# Patient Record
Sex: FEMALE | URBAN - METROPOLITAN AREA
[De-identification: names, ages, dates, MRNs, and addresses within clinical notes are randomized per-mention and may not be internally consistent; named-entity substitution may affect disease eponyms.]

---

## 2021-02-14 ENCOUNTER — INPATIENT (INPATIENT)
Facility: HOSPITAL | Age: 21
LOS: 4 days | Discharge: HOME | DRG: 918 | End: 2021-02-19
Attending: INTERNAL MEDICINE | Admitting: INTERNAL MEDICINE
Payer: MEDICAID

## 2021-02-14 VITALS
HEART RATE: 107 BPM | DIASTOLIC BLOOD PRESSURE: 95 MMHG | OXYGEN SATURATION: 96 % | HEIGHT: 65 IN | WEIGHT: 130.07 LBS | RESPIRATION RATE: 15 BRPM | TEMPERATURE: 98 F | SYSTOLIC BLOOD PRESSURE: 133 MMHG

## 2021-02-14 LAB
ALBUMIN SERPL ELPH-MCNC: 4.5 G/DL — SIGNIFICANT CHANGE UP (ref 3.4–5)
ALP SERPL-CCNC: 54 U/L — SIGNIFICANT CHANGE UP (ref 40–120)
ALT FLD-CCNC: 20 U/L — SIGNIFICANT CHANGE UP (ref 12–42)
AMPHET UR-MCNC: NEGATIVE — SIGNIFICANT CHANGE UP
ANION GAP SERPL CALC-SCNC: 8 MMOL/L — LOW (ref 9–16)
APAP SERPL-MCNC: <2 UG/ML — LOW (ref 10–30)
APPEARANCE UR: CLEAR — SIGNIFICANT CHANGE UP
APTT BLD: 28.9 SEC — SIGNIFICANT CHANGE UP (ref 27.5–35.5)
AST SERPL-CCNC: 29 U/L — SIGNIFICANT CHANGE UP (ref 15–37)
BACTERIA # UR AUTO: ABNORMAL /HPF
BARBITURATES UR SCN-MCNC: NEGATIVE — SIGNIFICANT CHANGE UP
BENZODIAZ UR-MCNC: NEGATIVE — SIGNIFICANT CHANGE UP
BILIRUB SERPL-MCNC: 0.3 MG/DL — SIGNIFICANT CHANGE UP (ref 0.2–1.2)
BILIRUB UR-MCNC: NEGATIVE — SIGNIFICANT CHANGE UP
BUN SERPL-MCNC: 12 MG/DL — SIGNIFICANT CHANGE UP (ref 7–23)
CALCIUM SERPL-MCNC: 9.5 MG/DL — SIGNIFICANT CHANGE UP (ref 8.5–10.5)
CHLORIDE SERPL-SCNC: 107 MMOL/L — SIGNIFICANT CHANGE UP (ref 96–108)
CK SERPL-CCNC: 112 U/L — SIGNIFICANT CHANGE UP (ref 26–192)
CO2 SERPL-SCNC: 29 MMOL/L — SIGNIFICANT CHANGE UP (ref 22–31)
COCAINE METAB.OTHER UR-MCNC: NEGATIVE — SIGNIFICANT CHANGE UP
COLOR SPEC: YELLOW — SIGNIFICANT CHANGE UP
CREAT SERPL-MCNC: 0.66 MG/DL — SIGNIFICANT CHANGE UP (ref 0.5–1.3)
DIFF PNL FLD: NEGATIVE — SIGNIFICANT CHANGE UP
EPI CELLS # UR: ABNORMAL /HPF (ref 0–5)
ETHANOL SERPL-MCNC: 175 MG/DL — HIGH
GLUCOSE SERPL-MCNC: 85 MG/DL — SIGNIFICANT CHANGE UP (ref 70–99)
GLUCOSE UR QL: NEGATIVE — SIGNIFICANT CHANGE UP
HCG SERPL-ACNC: <1 MIU/ML — SIGNIFICANT CHANGE UP
INR BLD: 1.06 — SIGNIFICANT CHANGE UP (ref 0.88–1.16)
KETONES UR-MCNC: NEGATIVE — SIGNIFICANT CHANGE UP
LACTATE SERPL-SCNC: 3.2 MMOL/L — HIGH (ref 0.4–2)
LEUKOCYTE ESTERASE UR-ACNC: ABNORMAL
MAGNESIUM SERPL-MCNC: 2.2 MG/DL — SIGNIFICANT CHANGE UP (ref 1.6–2.6)
METHADONE UR-MCNC: NEGATIVE — SIGNIFICANT CHANGE UP
NITRITE UR-MCNC: NEGATIVE — SIGNIFICANT CHANGE UP
OPIATES UR-MCNC: NEGATIVE — SIGNIFICANT CHANGE UP
PCO2 BLDV: 55 MMHG — HIGH (ref 41–51)
PCP SPEC-MCNC: SIGNIFICANT CHANGE UP
PCP UR-MCNC: NEGATIVE — SIGNIFICANT CHANGE UP
PH BLDV: 7.33 — SIGNIFICANT CHANGE UP (ref 7.32–7.43)
PH UR: 5.5 — SIGNIFICANT CHANGE UP (ref 5–8)
PO2 BLDV: 26 MMHG — LOW (ref 35–40)
POTASSIUM SERPL-MCNC: 3.8 MMOL/L — SIGNIFICANT CHANGE UP (ref 3.5–5.3)
POTASSIUM SERPL-SCNC: 3.8 MMOL/L — SIGNIFICANT CHANGE UP (ref 3.5–5.3)
PROT SERPL-MCNC: 7.8 G/DL — SIGNIFICANT CHANGE UP (ref 6.4–8.2)
PROT UR-MCNC: NEGATIVE MG/DL — SIGNIFICANT CHANGE UP
PROTHROM AB SERPL-ACNC: 12.7 SEC — SIGNIFICANT CHANGE UP (ref 10.6–13.6)
RBC CASTS # UR COMP ASSIST: < 5 /HPF — SIGNIFICANT CHANGE UP
SALICYLATES SERPL-MCNC: 1.4 MG/DL — LOW (ref 2.8–20)
SAO2 % BLDV: 38 % — SIGNIFICANT CHANGE UP
SARS-COV-2 RNA SPEC QL NAA+PROBE: SIGNIFICANT CHANGE UP
SODIUM SERPL-SCNC: 144 MMOL/L — SIGNIFICANT CHANGE UP (ref 132–145)
SP GR SPEC: 1.01 — SIGNIFICANT CHANGE UP (ref 1–1.03)
THC UR QL: NEGATIVE — SIGNIFICANT CHANGE UP
TROPONIN I SERPL-MCNC: <0.017 NG/ML — LOW (ref 0.02–0.06)
UROBILINOGEN FLD QL: 0.2 E.U./DL — SIGNIFICANT CHANGE UP
WBC UR QL: < 5 /HPF — SIGNIFICANT CHANGE UP

## 2021-02-14 PROCEDURE — 99285 EMERGENCY DEPT VISIT HI MDM: CPT | Mod: 25

## 2021-02-14 PROCEDURE — 93010 ELECTROCARDIOGRAM REPORT: CPT

## 2021-02-14 PROCEDURE — 71045 X-RAY EXAM CHEST 1 VIEW: CPT | Mod: 26

## 2021-02-14 RX ORDER — SODIUM CHLORIDE 9 MG/ML
2000 INJECTION INTRAMUSCULAR; INTRAVENOUS; SUBCUTANEOUS ONCE
Refills: 0 | Status: COMPLETED | OUTPATIENT
Start: 2021-02-14 | End: 2021-02-14

## 2021-02-14 RX ORDER — ONDANSETRON 8 MG/1
4 TABLET, FILM COATED ORAL ONCE
Refills: 0 | Status: COMPLETED | OUTPATIENT
Start: 2021-02-14 | End: 2021-02-14

## 2021-02-14 RX ADMIN — SODIUM CHLORIDE 2000 MILLILITER(S): 9 INJECTION INTRAMUSCULAR; INTRAVENOUS; SUBCUTANEOUS at 21:46

## 2021-02-14 NOTE — ED PROVIDER NOTE - NS ED MD DISPO ISOLATION TYPES
Gabe Victoria is a 52 y.o. female (: 1970) presenting to address:    Chief Complaint   Patient presents with    Results     LAB RESULTS       There were no vitals filed for this visit. Hearing/Vision:   No exam data present    Learning Assessment:     Learning Assessment 2015   PRIMARY LEARNER Patient   HIGHEST LEVEL OF EDUCATION - PRIMARY LEARNER  4 YEARS OF COLLEGE   BARRIERS PRIMARY LEARNER NONE   CO-LEARNER CAREGIVER No   PRIMARY LANGUAGE ENGLISH   LEARNER PREFERENCE PRIMARY DEMONSTRATION   ANSWERED BY self   RELATIONSHIP SELF     Depression Screening:     3 most recent PHQ Screens 2018   Little interest or pleasure in doing things Not at all   Feeling down, depressed, irritable, or hopeless Not at all   Total Score PHQ 2 0     Fall Risk Assessment:     Fall Risk Assessment, last 12 mths 3/27/2020   Able to walk? Yes   Fall in past 12 months? No     Abuse Screening:     Abuse Screening Questionnaire 3/27/2020   Do you ever feel afraid of your partner? N   Are you in a relationship with someone who physically or mentally threatens you? N   Is it safe for you to go home? Y     Coordination of Care Questionaire:   1. Have you been to the ER, urgent care clinic since your last visit? Hospitalized since your last visit? NO    2. Have you seen or consulted any other health care providers outside of the [address] [place] [address] since your last visit? Include any pap smears or colon screening. YES    Advanced Directive:   1. Do you have an Advanced Directive? NO    2. Would you like information on Advanced Directives? YES    Mobile number on file is fine. None

## 2021-02-14 NOTE — ED PROVIDER NOTE - CLINICAL SUMMARY MEDICAL DECISION MAKING FREE TEXT BOX
plan to do a full tox work up, spoke with Chapo sotelo present and available  and mother Yadi  plan to contact them, and consult tox. currently stable, good end tidal CO2, has no signs of head injury

## 2021-02-14 NOTE — ED ADULT NURSE NOTE - NSIMPLEMENTINTERV_GEN_ALL_ED
Implemented All Fall Risk Interventions:  [place] to call system. Call bell, personal items and telephone within reach. Instruct patient to call for assistance. Room bathroom lighting operational. Non-slip footwear when patient is off stretcher. Physically safe environment: no spills, clutter or unnecessary equipment. Stretcher in lowest position, wheels locked, appropriate side rails in place. Provide visual cue, wrist band, yellow gown, etc. Monitor gait and stability. Monitor for mental status changes and reorient to person, place, and time. Review medications for side effects contributing to fall risk. Reinforce activity limits and safety measures with patient and family.

## 2021-02-14 NOTE — CONSULT NOTE ADULT - ASSESSMENT
·	This patient is sedated and has an unclear history of possible multiple drug ingestions.   ·	Zolpidem overdosages can cause sedation. Treatment is generally supportive with close monitoring of airway.   ·	Sertraline is an SSRI and overdosages can cause serotonin toxicity which the patient does not clearly exhibit.   ·	Lithium overdosages can present acutely with GI disturbances and chronically with CNS signs and symptoms such as tremor, fasiculations, hyperreflexia, nystagmus, choreoathetosis, coma, seizures. This patient does not clearly demonstrate any of these however in light of an unclear history, would agree to sending serum Lithium levels.    ·	Continue supportive care and admit the patient for further observation until patient is back to baseline mentation.     Thank you for the consult.  ·	This patient is sedated and has an unclear history of possible multiple drug ingestions.   ·	Zolpidem overdosages can cause sedation. Treatment is generally supportive with close monitoring of airway.   ·	Sertraline is an SSRI and overdosages can cause serotonin toxicity which the patient does not clearly exhibit.   ·	Lithium overdosages can present acutely with GI disturbances and chronically with CNS signs and symptoms such as tremor, fasiculations, hyperreflexia, nystagmus, choreoathetosis, coma, seizures. This patient does not clearly demonstrate any of these however in light of an unclear history, would agree to sending serum Lithium levels.    ·	Continue supportive care and admit the patient for further observation until patient is back to baseline mentation.     FOLLOW UP @ 1:13AM    ·	Patient improved  and awake after supportive care  ·	Now endorses to have taken 6 tabs of Lithium and 12 tabes of Sertraline as well.  ·	Recommend to repeat Serum Lithium levels after IVF and trend results.     Thank you for the consult.

## 2021-02-14 NOTE — ED ADULT TRIAGE NOTE - CHIEF COMPLAINT QUOTE
Pt brought in by EMS for complaint of an overdose of zolpidem. Per pt's friend, pt had gone into the bathroom approximately 30 minutes PTA and then told him she took about 20 pills of 5mg tablets of zolpidem. Pt's friend also states she drank 5 or more alcoholic drinks today. Pt drowsy at triage, unable to answer questions, vomited once PTA.

## 2021-02-14 NOTE — ED PROVIDER NOTE - OBJECTIVE STATEMENT
19 yo F, unknown PMHx, presents with overdose. Patient arrives via EMS with a friend whom she is staying with. per friend - Chapo , patient has been staying with him since this past wednesday. lives with mother in [facility] - [place] . Notes they were out to dinner this evening around 7 pm, and prior to that patient had been drinking. he estimates she had about 6 drinks. During dinner she left the restaurant, and wandered around, and called him that she was lost. he found her and took her back to the apartment, where she went into the bathroom. shortly after she comes out of the bathroom telling him she was worried she might die because she took pills. friend bought pill bottles and ambien bottle was empty. patient on zolpidem 5 mg 1/2-1 tablet QHS, #30 dispensed on  and bottle empty now. also takes lithium 300 mg BID, #180 dispensed on 10/21/2020 with 75 left in bottle, Hydroxyzine 25 mg 1 BID PRN, #60  on 2020, 24 left in bottle, and sertraline #90 dispensed on , 47 left in bottle. per friend, he is unaware of pmhx, or other drug use. denies witnessed trauma.

## 2021-02-14 NOTE — ED PROVIDER NOTE - PROGRESS NOTE DETAILS
spoke to toxicology fellow - Dr. Au  Does not recommend activated charcoal, based on history, and possible od with only ambien. rule out lithium overdose. supportive treatment. satting well and end tidal stable. patient is speaking with her mother on the phone, she is now awake, coherent, alert and oriented x 3 with no acute medical complaints. awaiting repeat alcohol and lactic and will consult with telypsych. patient is speaking with her mother on the phone, she is now awake, coherent, alert and oriented x 3 with no acute medical complaints. awaiting repeat alcohol and lactic and will consult with telypsych. per patient, she took a handful of sertraline about 12 pills, about 6 lihtium, and the bottle of ambien. patient notes she was trying to kill herself. notes her last admission for similar was 1 yr ago, admitted multiple times in the past since an adolescent. patient currently is more awake, awaiting sobriety to speak to telypsych. spoke to dr kelly  on call, advised to repeat lithium level 5 hours after fluid hydration. if lithium level is downtrending at that point, she is cleared medically. will repeat lihtium at 4:30 patient accepted to tele step down - dr tucker

## 2021-02-14 NOTE — ED PROVIDER NOTE - CARE PLAN
Principal Discharge DX:	Drug overdose, undetermined intent, initial encounter   Principal Discharge DX:	Drug overdose, undetermined intent, initial encounter  Secondary Diagnosis:	Suicidal ideations

## 2021-02-14 NOTE — CONSULT NOTE ADULT - SUBJECTIVE AND OBJECTIVE BOX
MEDICAL TOXICOLOGY CONSULT    HPI: 20 Yr old female unknown PMH from [place] living with a friend here in [place] brought to ED after intentional OD on unknown amount of possibly Ambien (Zolpidem) at 7 PM. She also takes Lithium, sertraline and Hydroxyzine  however a manual count does not reveal missing tablets. Friend is unsure if she dosed on all or not. She endorses that patient was drinking tonight.       ONSET / TIME of exposure(s): 7 PM    QUANTITY of exposure(s):     ROUTE of exposure: Ingestion    CONTEXT of exposure: Home    ASSOCIATED symptoms: As above.     PAST MEDICAL & SURGICAL HISTORY: Unavailable due to intoxication     REVIEW OF SYSTEMS:  Unable to perform due to intoxication.     Vital Signs Last 24 Hrs  T(C): 36.6 (14 Feb 2021 20:31), Max: 36.6 (14 Feb 2021 20:31)  T(F): 97.8 (14 Feb 2021 20:31), Max: 97.8 (14 Feb 2021 20:31)  HR: 86 (14 Feb 2021 21:48) (86 - 107)  BP: 103/68 (14 Feb 2021 21:48) (103/68 - 133/95)  BP(mean): 80 (14 Feb 2021 21:48) (80 - 86)  RR: 16 (14 Feb 2021 21:48) (15 - 18)  SpO2: 99% (14 Feb 2021 21:48) (89% - 99%)    SIGNIFICANT LABORATORY STUDIES:    02-14    144  |  107  |  12  ----------------------------<  85  3.8   |  29  |  0.66    Ca    9.5      14 Feb 2021 21:10  Mg     2.2     02-14    TPro  7.8  /  Alb  4.5  /  TBili  0.3  /  DBili  x   /  AST  29  /  ALT  20  /  AlkPhos  54  02-14      PT/INR - ( 14 Feb 2021 21:10 )   PT: 12.7 sec;   INR: 1.06          PTT - ( 14 Feb 2021 21:10 )  PTT:28.9 sec        Anion Gap: -- 02-14 @ 21:56  CK: -- 02-14 @ 21:56  Troponin:  <0.017<L>  02-14 @ 21:56  Pro-BNP:  --  02-14 @ 21:56  VBG:  --  02-14 @ 21:56  Carboxyhemoglobin %:  --  02-14 @ 21:56  Methemoglobin %:  --  02-14 @ 21:56  Osmolality Serum:  --  02-14 @ 21:56  Aspirin Level: --  02-14 @ 21:56  Acetaminophen Level:  --  02-14 @ 21:56  Ethanol Level:  --  02-14 @ 21:56  Digoxin Level:  --  02-14 @ 21:56  Phenytoin Level:  --  02-14 @ 21:56  Carbamazepine level:  --  02-14 @ 21:56  Lamotrigine level:  --  02-14 @ 21:56  Anion Gap: 8<L> 02-14 @ 21:10  CK: 112 02-14 @ 21:10  Troponin:  --  02-14 @ 21:10  Pro-BNP:  --  02-14 @ 21:10  VBG:  --  02-14 @ 21:10  Carboxyhemoglobin %:  --  02-14 @ 21:10  Methemoglobin %:  --  02-14 @ 21:10  Osmolality Serum:  --  02-14 @ 21:10  Aspirin Level: 1.4<L>  02-14 @ 21:10  Acetaminophen Level:  <2.0<L>  02-14 @ 21:10  Ethanol Level:  --  02-14 @ 21:10  Digoxin Level:  --  02-14 @ 21:10  Phenytoin Level:  --  02-14 @ 21:10  Carbamazepine level:  --  02-14 @ 21:10  Lamotrigine level:  --  02-14 @ 21:10     MEDICAL TOXICOLOGY CONSULT    HPI: 20 Yr old female unknown PMH from [place] living with a friend here in [place] brought to ED after intentional OD on unknown amount of possibly Ambien (Zolpidem) at 7 PM. She also takes Lithium, sertraline and Hydroxyzine  however a manual count does not reveal missing tablets. Friend is unsure if she dosed on all or not. She endorses that patient was drinking tonight.     ONSET / TIME of exposure(s): 7 PM    QUANTITY of exposure(s):   Zolpidem 5 mg 1/2-1 tablet QHS, #30 dispensed on  and bottle empty  Lithium 300 mg BID, #180 dispensed on 10/21/2020 with 75 left in bottle   Hydroxyzine 25 mg 1 BID PRN, #60  on 2020, 24 left in bottle  Sertraline #90 dispensed on , 47 left in bottle        ROUTE of exposure: Ingestion    CONTEXT of exposure: Home    ASSOCIATED symptoms: As above.     PAST MEDICAL & SURGICAL HISTORY: Unavailable due to intoxication     REVIEW OF SYSTEMS:  Unable to perform due to intoxication.     Vital Signs Last 24 Hrs  T(C): 36.6 (2021 20:31), Max: 36.6 (2021 20:31)  T(F): 97.8 (2021 20:31), Max: 97.8 (2021 20:31)  HR: 86 (2021 21:48) (86 - 107)  BP: 103/68 (2021 21:48) (103/68 - 133/95)  BP(mean): 80 (2021 21:48) (80 - 86)  RR: 16 (2021 21:48) (15 - 18)  SpO2: 99% (2021 21:48) (89% - 99%)    SIGNIFICANT LABORATORY STUDIES:        144  |  107  |  12  ----------------------------<  85  3.8   |  29  |  0.66    Ca    9.5      2021 21:10  Mg     2.2         TPro  7.8  /  Alb  4.5  /  TBili  0.3  /  DBili  x   /  AST  29  /  ALT  20  /  AlkPhos  54      PT/INR - ( 2021 21:10 )   PT: 12.7 sec;   INR: 1.06     PTT - ( 2021 21:10 )  PTT:28.9 sec    Troponin:  <0.017<L>   @ 21:56  Anion Gap: 8<L>  @ 21:10  Aspirin Level: 1.4<L>   @ 21:10  Acetaminophen Level:  <2.0<L>   @ 21:10       MEDICAL TOXICOLOGY CONSULT    HPI: 20 Yr old female unknown PMH from [place] living with a friend here in [place] brought to ED after intentional OD on unknown amount of possibly Ambien (Zolpidem) at 7 PM. She also takes Lithium, sertraline and Hydroxyzine however a manual count does not reveal missing tablets. Friend is unsure if she dosed on all or not. She endorses that patient was drinking tonight.     ONSET / TIME of exposure(s): 7 PM    QUANTITY of exposure(s):   Zolpidem 5 mg 1/2-1 tablet QHS, #30 dispensed on  and bottle empty  Lithium 300 mg BID, #180 dispensed on 10/21/2020 with 75 left in bottle   Hydroxyzine 25 mg 1 BID PRN, #60  on 2020, 24 left in bottle  Sertraline #90 dispensed on , 47 left in bottle        ROUTE of exposure: Ingestion    CONTEXT of exposure: Home    ASSOCIATED symptoms: As above.     PAST MEDICAL & SURGICAL HISTORY: Unavailable due to intoxication     REVIEW OF SYSTEMS:  Unable to perform due to intoxication.     Vital Signs Last 24 Hrs  T(C): 36.6 (2021 20:31), Max: 36.6 (2021 20:31)  T(F): 97.8 (2021 20:31), Max: 97.8 (2021 20:31)  HR: 86 (2021 21:48) (86 - 107)  BP: 103/68 (2021 21:48) (103/68 - 133/95)  BP(mean): 80 (2021 21:48) (80 - 86)  RR: 16 (2021 21:48) (15 - 18)  SpO2: 99% (2021 21:48) (89% - 99%)    SIGNIFICANT LABORATORY STUDIES:        144  |  107  |  12  ----------------------------<  85  3.8   |  29  |  0.66    Ca    9.5      2021 21:10  Mg     2.2         TPro  7.8  /  Alb  4.5  /  TBili  0.3  /  DBili  x   /  AST  29  /  ALT  20  /  AlkPhos  54      PT/INR - ( 2021 21:10 )   PT: 12.7 sec;   INR: 1.06     PTT - ( 2021 21:10 )  PTT:28.9 sec    Troponin:  <0.017<L>   @ 21:56  Anion Gap: 8<L>  @ 21:10  Aspirin Level: 1.4<L>   @ 21:10  Acetaminophen Level:  <2.0<L>   @ 21:10

## 2021-02-15 DIAGNOSIS — Z98.890 OTHER SPECIFIED POSTPROCEDURAL STATES: Chronic | ICD-10-CM

## 2021-02-15 DIAGNOSIS — Z29.9 ENCOUNTER FOR PROPHYLACTIC MEASURES, UNSPECIFIED: ICD-10-CM

## 2021-02-15 DIAGNOSIS — T14.91XA SUICIDE ATTEMPT, INITIAL ENCOUNTER: ICD-10-CM

## 2021-02-15 DIAGNOSIS — R94.31 ABNORMAL ELECTROCARDIOGRAM [ECG] [EKG]: ICD-10-CM

## 2021-02-15 DIAGNOSIS — F31.81 BIPOLAR II DISORDER: ICD-10-CM

## 2021-02-15 LAB
ALBUMIN SERPL ELPH-MCNC: 4.2 G/DL — SIGNIFICANT CHANGE UP (ref 3.3–5)
ALP SERPL-CCNC: 47 U/L — SIGNIFICANT CHANGE UP (ref 40–120)
ALT FLD-CCNC: 10 U/L — SIGNIFICANT CHANGE UP (ref 10–45)
ANION GAP SERPL CALC-SCNC: 13 MMOL/L — SIGNIFICANT CHANGE UP (ref 5–17)
ANION GAP SERPL CALC-SCNC: 13 MMOL/L — SIGNIFICANT CHANGE UP (ref 5–17)
AST SERPL-CCNC: 17 U/L — SIGNIFICANT CHANGE UP (ref 10–40)
BASE EXCESS BLDA CALC-SCNC: -1.4 MMOL/L — SIGNIFICANT CHANGE UP (ref -2–3)
BILIRUB SERPL-MCNC: 0.4 MG/DL — SIGNIFICANT CHANGE UP (ref 0.2–1.2)
BUN SERPL-MCNC: 10 MG/DL — SIGNIFICANT CHANGE UP (ref 7–23)
BUN SERPL-MCNC: 7 MG/DL — SIGNIFICANT CHANGE UP (ref 7–23)
CALCIUM SERPL-MCNC: 8.8 MG/DL — SIGNIFICANT CHANGE UP (ref 8.4–10.5)
CALCIUM SERPL-MCNC: 9.3 MG/DL — SIGNIFICANT CHANGE UP (ref 8.4–10.5)
CHLORIDE SERPL-SCNC: 103 MMOL/L — SIGNIFICANT CHANGE UP (ref 96–108)
CHLORIDE SERPL-SCNC: 104 MMOL/L — SIGNIFICANT CHANGE UP (ref 96–108)
CO2 SERPL-SCNC: 23 MMOL/L — SIGNIFICANT CHANGE UP (ref 22–31)
CO2 SERPL-SCNC: 23 MMOL/L — SIGNIFICANT CHANGE UP (ref 22–31)
CREAT SERPL-MCNC: 0.64 MG/DL — SIGNIFICANT CHANGE UP (ref 0.5–1.3)
CREAT SERPL-MCNC: 0.76 MG/DL — SIGNIFICANT CHANGE UP (ref 0.5–1.3)
ETHANOL SERPL-MCNC: 120 MG/DL — HIGH
GAS PNL BLDA: SIGNIFICANT CHANGE UP
GLUCOSE SERPL-MCNC: 80 MG/DL — SIGNIFICANT CHANGE UP (ref 70–99)
GLUCOSE SERPL-MCNC: 95 MG/DL — SIGNIFICANT CHANGE UP (ref 70–99)
HCO3 BLDA-SCNC: 23 MMOL/L — SIGNIFICANT CHANGE UP (ref 21–28)
LACTATE SERPL-SCNC: 0.6 MMOL/L — SIGNIFICANT CHANGE UP (ref 0.5–2)
LACTATE SERPL-SCNC: 2.5 MMOL/L — HIGH (ref 0.4–2)
LITHIUM SERPL-MCNC: 0.65 MMOL/L — SIGNIFICANT CHANGE UP (ref 0.6–1.2)
LITHIUM SERPL-MCNC: 1.05 MMOL/L — SIGNIFICANT CHANGE UP (ref 0.6–1.2)
LITHIUM SERPL-MCNC: 1.3 MMOL/L — HIGH (ref 0.6–1.2)
LITHIUM SERPL-MCNC: 1.9 MMOL/L — CRITICAL HIGH (ref 0.6–1.2)
MAGNESIUM SERPL-MCNC: 1.9 MG/DL — SIGNIFICANT CHANGE UP (ref 1.6–2.6)
MAGNESIUM SERPL-MCNC: 2 MG/DL — SIGNIFICANT CHANGE UP (ref 1.6–2.6)
PCO2 BLDA: 36 MMHG — SIGNIFICANT CHANGE UP (ref 32–45)
PH BLDA: 7.41 — SIGNIFICANT CHANGE UP (ref 7.35–7.45)
PO2 BLDA: 145 MMHG — HIGH (ref 83–108)
POTASSIUM SERPL-MCNC: 3.5 MMOL/L — SIGNIFICANT CHANGE UP (ref 3.5–5.3)
POTASSIUM SERPL-MCNC: 3.9 MMOL/L — SIGNIFICANT CHANGE UP (ref 3.5–5.3)
POTASSIUM SERPL-SCNC: 3.5 MMOL/L — SIGNIFICANT CHANGE UP (ref 3.5–5.3)
POTASSIUM SERPL-SCNC: 3.9 MMOL/L — SIGNIFICANT CHANGE UP (ref 3.5–5.3)
PROT SERPL-MCNC: 6.3 G/DL — SIGNIFICANT CHANGE UP (ref 6–8.3)
SAO2 % BLDA: 99 % — SIGNIFICANT CHANGE UP (ref 95–100)
SODIUM SERPL-SCNC: 139 MMOL/L — SIGNIFICANT CHANGE UP (ref 135–145)
SODIUM SERPL-SCNC: 140 MMOL/L — SIGNIFICANT CHANGE UP (ref 135–145)

## 2021-02-15 PROCEDURE — 99223 1ST HOSP IP/OBS HIGH 75: CPT | Mod: GC

## 2021-02-15 PROCEDURE — 99220: CPT | Mod: 25

## 2021-02-15 RX ORDER — SODIUM CHLORIDE 9 MG/ML
1000 INJECTION INTRAMUSCULAR; INTRAVENOUS; SUBCUTANEOUS
Refills: 0 | Status: DISCONTINUED | OUTPATIENT
Start: 2021-02-15 | End: 2021-02-16

## 2021-02-15 RX ORDER — INFLUENZA VIRUS VACCINE 15; 15; 15; 15 UG/.5ML; UG/.5ML; UG/.5ML; UG/.5ML
0.5 SUSPENSION INTRAMUSCULAR ONCE
Refills: 0 | Status: COMPLETED | OUTPATIENT
Start: 2021-02-15 | End: 2021-02-15

## 2021-02-15 RX ORDER — POTASSIUM CHLORIDE 20 MEQ
10 PACKET (EA) ORAL ONCE
Refills: 0 | Status: COMPLETED | OUTPATIENT
Start: 2021-02-15 | End: 2021-02-17

## 2021-02-15 RX ORDER — SODIUM CHLORIDE 9 MG/ML
1000 INJECTION INTRAMUSCULAR; INTRAVENOUS; SUBCUTANEOUS ONCE
Refills: 0 | Status: COMPLETED | OUTPATIENT
Start: 2021-02-15 | End: 2021-02-15

## 2021-02-15 RX ORDER — POTASSIUM CHLORIDE 20 MEQ
40 PACKET (EA) ORAL ONCE
Refills: 0 | Status: COMPLETED | OUTPATIENT
Start: 2021-02-15 | End: 2021-02-15

## 2021-02-15 RX ADMIN — SODIUM CHLORIDE 1000 MILLILITER(S): 9 INJECTION INTRAMUSCULAR; INTRAVENOUS; SUBCUTANEOUS at 07:15

## 2021-02-15 RX ADMIN — Medication 40 MILLIEQUIVALENT(S): at 14:03

## 2021-02-15 RX ADMIN — SODIUM CHLORIDE 150 MILLILITER(S): 9 INJECTION INTRAMUSCULAR; INTRAVENOUS; SUBCUTANEOUS at 07:45

## 2021-02-15 RX ADMIN — ONDANSETRON 4 MILLIGRAM(S): 8 TABLET, FILM COATED ORAL at 00:09

## 2021-02-15 RX ADMIN — SODIUM CHLORIDE 2000 MILLILITER(S): 9 INJECTION INTRAMUSCULAR; INTRAVENOUS; SUBCUTANEOUS at 01:23

## 2021-02-15 RX ADMIN — SODIUM CHLORIDE 1000 MILLILITER(S): 9 INJECTION INTRAMUSCULAR; INTRAVENOUS; SUBCUTANEOUS at 13:24

## 2021-02-15 RX ADMIN — SODIUM CHLORIDE 1000 MILLILITER(S): 9 INJECTION INTRAMUSCULAR; INTRAVENOUS; SUBCUTANEOUS at 01:22

## 2021-02-15 NOTE — PATIENT PROFILE ADULT - STATED REASON FOR ADMISSION
Suicidal attempt- pt was visiting [place] from [place] to spend a week visit with someone she met in [place] a few weeks ago. They had previously been getting along well, but she states he "was not the person I thought he was." Pt admits to heavily drinking once she realized this. Started drinking yesterday morning. Had about 12 drinks throughout course of day. In the evening, pt went to the bathroom feeling very depressed and took a handful of pills (mix of ambien, zoloft, and lithium). Pt states before this episode, her psych health was being managed well and she was compliant with meds. Does, however, have a history of depressive episodes since age 12, prior self-harm attempts, and prior psych hospitalizations. This was her first suicide attempt.

## 2021-02-15 NOTE — PATIENT PROFILE ADULT - NSPRESCRALCFREQ_GEN_A_NUR
Episodes of binge drinking depending on life events. may go months at a time not drinking./Monthly or less

## 2021-02-15 NOTE — H&P ADULT - PROBLEM SELECTOR PLAN 4
IVF: NS at 150cc/h  E: Replete K<4 and Mg<2  GI: None  DVT: None  Diet: Regular  Code: FULL CODE  Dispo: [facility]  1:1 Observation

## 2021-02-15 NOTE — ED ADULT NURSE REASSESSMENT NOTE - NS ED NURSE REASSESS COMMENT FT1
Patient remains on continuous observation 1:1, safety and falls protocol maintained.  Awaiting admission and transportation to [facility]. Pt educated on care plan and dx process. A friend dropped of pts phone. Will continue to monitor.

## 2021-02-15 NOTE — H&P ADULT - PROBLEM SELECTOR PLAN 2
Pt has known hx of BPD, Type 2. Takes Lithium and Sertraline as prescribed by Psychiatrist Dr. Garcia at [facility] in [place], [place]. [phone].  - Collateral on 2/16 after holiday weekend; inquire as to why pt is on SSRI despite frequent suicidal ideation  - f/u Psych recommendations regarding when to start back Lithium therapy/whether or not to taper off SSRI given that pt is s/p suicide attempt Pt has known hx of BPD, Type 2. Takes Lithium and Sertraline as prescribed by Psychiatrist Dr. Garcia at [facility] in [place], [place]. [phone].  - Collateral on 2/16 after holiday weekend; inquire as to why pt is on SSRI despite frequent suicidal ideation  - f/u Psych recommendations regarding when to start back Lithium therapy/whether or not to taper off SSRI given that pt is s/p possible suicide attempt

## 2021-02-15 NOTE — H&P ADULT - NSHPLABSRESULTS_GEN_ALL_CORE
02-15    140  |  104  |  7   ----------------------------<  80  3.5   |  23  |  0.64    Ca    8.8      15 Feb 2021 12:32  Mg     1.9     02-15    TPro  6.3  /  Alb  4.2  /  TBili  0.4  /  DBili  x   /  AST  17  /  ALT  10  /  AlkPhos  47  02-15          ABG - ( 15 Feb 2021 12:30 )  pH, Arterial: 7.41  pH, Blood: x     /  pCO2: 36    /  pO2: 145   / HCO3: 23    / Base Excess: -1.4  /  SaO2: 99                  Urinalysis Basic - ( 2021 23:01 )    Color: Yellow / Appearance: Clear / S.010 / pH: x  Gluc: x / Ketone: NEGATIVE  / Bili: NEGATIVE / Urobili: 0.2 E.U./dL   Blood: x / Protein: NEGATIVE mg/dL / Nitrite: NEGATIVE   Leuk Esterase: Trace / RBC: < 5 /HPF / WBC < 5 /HPF   Sq Epi: x / Non Sq Epi: Moderate /HPF / Bacteria: Many /HPF        PT/INR - ( 2021 21:10 )   PT: 12.7 sec;   INR: 1.06          PTT - ( 2021 21:10 )  PTT:28.9 sec    CARDIAC MARKERS ( 2021 21:56 )  <0.017 ng/mL / x     / x     / x     / x      CARDIAC MARKERS ( 2021 21:10 )  x     / x     / 112 U/L / x     / x            CAPILLARY BLOOD GLUCOSE            Xray Chest 1 View AP/PA:   EXAM:  XR CHEST AP OR PA 1V                           PROCEDURE DATE:  2021          INTERPRETATION:  Clinical history/reason for exam: Overdose.    Frontal chest.    No comparison.    Findings/  impression: Heart, lungs, mediastinum and thorax are unremarkable. Scoliosis.          Thank you for the opportunity to participate in the care of this patient.      FLORA EPSTEIN MD; Attending Radiologist  This document has been electronically signed. Feb 15 2021  5:03AM (21 @ 21:24)

## 2021-02-15 NOTE — H&P ADULT - ATTENDING COMMENTS
Polysubstance overdose, intentional with history of multiple attempts in the past. Hemodynamically stable.  Monitor Lithium levels; no evidence of toxicty on exam. Psychiatry evaluation. Monitor on [facility].
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
## 2021-02-15 NOTE — ED ADULT NURSE REASSESSMENT NOTE - NS ED NURSE REASSESS COMMENT FT1
pt care handed over  from paul knight, checked in on patient no need for cardiac monitor as per Dr lowry,  aware of tachycardia, for iv fluids, pt woke up whilst doing vital signs

## 2021-02-15 NOTE — ED ADULT NURSE REASSESSMENT NOTE - ANCILLARY STATUS
lithium level sent to [facility] for analysis via messenger//awaiting lab draw

## 2021-02-15 NOTE — ED ADULT NURSE REASSESSMENT NOTE - COMFORT CARE
1:1 maintained- w/ PCT Lebron/ambulated to bathroom/darkened lights/plan of care explained/po fluids offered/repositioned/side rails up/warm blanket provided

## 2021-02-15 NOTE — H&P ADULT - NSHPPHYSICALEXAM_GEN_ALL_CORE
.  VITAL SIGNS:  T(C): 37 (02-15-21 @ 13:32), Max: 37.2 (02-15-21 @ 10:02)  T(F): 98.6 (02-15-21 @ 13:32), Max: 99 (02-15-21 @ 10:02)  HR: 91 (02-15-21 @ 12:02) (86 - 120)  BP: 122/65 (02-15-21 @ 12:02) (103/68 - 133/95)  BP(mean): 80 (02-14-21 @ 21:48) (80 - 86)  RR: 18 (02-15-21 @ 12:02) (15 - 18)  SpO2: 94% (02-15-21 @ 12:02) (89% - 99%)  Wt(kg): --    PHYSICAL EXAM:    Constitutional: WDWN resting comfortably in bed; NAD  Head: NC/AT  Eyes: PERRL, EOMI, anicteric sclera  ENT: no nasal discharge; uvula midline, no oropharyngeal erythema or exudates; MMM  Neck: supple; no JVD or thyromegaly  Respiratory: CTA B/L; no W/R/R, no retractions  Cardiac: +S1/S2; RRR; no M/R/G; PMI non-displaced  Gastrointestinal: abdomen soft, NT/ND; no rebound or guarding; +BSx4  Extremities: WWP, no clubbing or cyanosis; no peripheral edema  Vascular: 2+ radial, femoral, DP pulses B/L  Dermatologic: skin warm, dry and intact; no rashes, wounds, or scars  Neurologic: AAOx3; CNII-XII grossly intact; no focal deficits  Psychiatric: Mildly depressed affect; appearance, verbalizations, behaviors are appropriate

## 2021-02-15 NOTE — ED ADULT NURSE REASSESSMENT NOTE - NS ED NURSE REASSESS COMMENT FT1
Pt's igor Dobbins , number given to pt also, number given to me by nurse in charge, advised pt to call mum

## 2021-02-15 NOTE — H&P ADULT - NSHPREVIEWOFSYSTEMS_GEN_ALL_CORE
REVIEW OF SYSTEMS:    CONSTITUTIONAL: No weakness, fevers or chills.   EYES/ENT: No visual changes;  No vertigo or throat pain   NECK: No pain or stiffness  RESPIRATORY: No cough, wheezing, hemoptysis; No shortness of breath  CARDIOVASCULAR: No chest pain or palpitations  GASTROINTESTINAL: Nausea and vomiting last night while intoxicated, resolved on 2/15; no abdominal pain, no diarrhea or constipation. No melena or hematochezia.  GENITOURINARY: No dysuria, frequency or hematuria  NEUROLOGICAL: No numbness or weakness  SKIN: No itching, burning, rashes, or lesions   All other review of systems is negative unless indicated above.

## 2021-02-15 NOTE — ED CDU PROVIDER INITIAL DAY NOTE - MEDICAL DECISION MAKING DETAILS
patient placed on obs for repeat lithium level in 4-5 hours, pending repeat vitals and medical clearance post overdose, and recommended by tox. if medically cleared, will require telypsych evaluation for admission.

## 2021-02-15 NOTE — ED ADULT NURSE REASSESSMENT NOTE - NS ED NURSE REASSESS COMMENT FT1
Pt is looking for mobile phone, called her friend Chapo, who admits to having her phone, advised him she is to be transferred to [facility] advised to call [facility] switchboard in 1 hr to see where pt went as bed not allocated , friend Chapo appeared to not understand why the patient might want her mobile phone, advised him to try and facilitate bringing pt her phone, pt aware

## 2021-02-15 NOTE — ED ADULT NURSE REASSESSMENT NOTE - GENERAL PATIENT STATE
comfortable appearance/cooperative/no change observed/resting/sleeping
denies SI/HI at this time/comfortable appearance/cooperative/resting/sleeping

## 2021-02-15 NOTE — H&P ADULT - PROBLEM SELECTOR PLAN 3
EKG noted to have RBBB and 1st degree block. Denies palpitations. HD stable at this time.  - f/u daily EKGs

## 2021-02-15 NOTE — ED CDU PROVIDER INITIAL DAY NOTE - OBJECTIVE STATEMENT
19 yo F, unknown PMHx, presents with overdose. Patient arrives via EMS with a friend whom she is staying with. per friend - Chapo , patient has been staying with him since this past wednesday. lives with mother in [facility] [place] . Notes they were out to dinner this evening around 7 pm, and prior to that patient had been drinking. he estimates she had about 6 drinks. During dinner she left the restaurant, and wandered around, and called him that she was lost. he found her and took her back to the apartment, where she went into the bathroom. shortly after she comes out of the bathroom telling him she was worried she might die because she took pills. friend bought pill bottles and ambien bottle was empty. patient on zolpidem 5 mg 1/2-1 tablet QHS, #30 dispensed on  and bottle empty now. also takes lithium 300 mg BID, #180 dispensed on 10/21/2020 with 75 left in bottle, Hydroxyzine 25 mg 1 BID PRN, #60  on 2020, 24 left in bottle, and sertraline #90 dispensed on , 47 left in bottle. per friend, he is unaware of pmhx, or other drug use. denies witnessed trauma.

## 2021-02-15 NOTE — H&P ADULT - PROBLEM SELECTOR PLAN 1
Pt admits to suicidal ideation with attempt on 2/14 PM by ingestion of Lithium, Sertraline, and Ambien (prescribed to her for treatment of BPD as below). Admits to drinking 10-12 beverages prior to incident as well as tremors, nausea/vomiting last night 2/14, but denies abdominal pain, heart palpitations, dyspnea, chest pain, or headache.  - Toxicology contacted, recommending observation in telemetry with IVF  - Monitor for Serotonin Syndrome, Lithium toxicity  - c/w NS at 150cc/h  - 1:1 Constant Observation  - Monitor Lithium level, ABG, Lactate  - Consult Psychiatry on 2/16 (out of office today 2/15) Pt admits to suicidal ideation with possible attempt on 2/14 PM (pt unsure if she intended to kill herself as she was intoxicated at the time) by ingestion of Lithium, Sertraline, and Ambien (prescribed to her for treatment of BPD as below). Admits to drinking 10-12 beverages prior to incident as well as tremors, nausea/vomiting last night 2/14, but denies abdominal pain, heart palpitations, dyspnea, chest pain, or headache.  - Toxicology contacted, recommending observation in telemetry with IVF  - Monitor for Serotonin Syndrome, Lithium toxicity  - c/w NS at 150cc/h  - 1:1 Constant Observation  - Monitor Lithium level, ABG, Lactate  - Consult Psychiatry on 2/16 (out of office today 2/15) Pt admits to suicidal ideation with "possible" attempt on 2/14 PM (pt unsure if she intended to kill herself as she was intoxicated at the time) by ingestion of Lithium, Sertraline, and Ambien (prescribed to her for treatment of BPD as below). Admits to drinking 10-12 beverages prior to incident as well as tremors, nausea/vomiting last night 2/14, but denies abdominal pain, heart palpitations, dyspnea, chest pain, or headache.  - Toxicology contacted, recommending observation in telemetry with IVF  - Monitor for Serotonin Syndrome, Lithium toxicity  - c/w NS at 150cc/h  - 1:1 Constant Observation  - Monitor Lithium level, ABG, Lactate  - Consult Psychiatry on 2/16 (out of office today 2/15)

## 2021-02-15 NOTE — ED CDU PROVIDER INITIAL DAY NOTE - PROGRESS NOTE DETAILS
patient is speaking with her mother on the phone, she is now awake, coherent, alert and oriented x 3 with no acute medical complaints. awaiting repeat alcohol and lactic and will consult with telypsych. per patient, she took a handful of sertraline about 12 pills, about 6 lihtium, and the bottle of ambien. patient notes she was trying to kill herself. notes her last admission for similar was 1 yr ago, admitted multiple times in the past since an adolescent. patient currently is more awake, awaiting sobriety to speak to telypsych. patient clinically stable, lithium level elevated. will call for admission.

## 2021-02-16 DIAGNOSIS — F10.99 ALCOHOL USE, UNSPECIFIED WITH UNSPECIFIED ALCOHOL-INDUCED DISORDER: ICD-10-CM

## 2021-02-16 LAB
-  AMIKACIN: SIGNIFICANT CHANGE UP
-  AMOXICILLIN/CLAVULANIC ACID: SIGNIFICANT CHANGE UP
-  AMPICILLIN/SULBACTAM: SIGNIFICANT CHANGE UP
-  AMPICILLIN: SIGNIFICANT CHANGE UP
-  AZTREONAM: SIGNIFICANT CHANGE UP
-  CEFAZOLIN: SIGNIFICANT CHANGE UP
-  CEFEPIME: SIGNIFICANT CHANGE UP
-  CEFOXITIN: SIGNIFICANT CHANGE UP
-  CEFTRIAXONE: SIGNIFICANT CHANGE UP
-  CIPROFLOXACIN: SIGNIFICANT CHANGE UP
-  ERTAPENEM: SIGNIFICANT CHANGE UP
-  GENTAMICIN: SIGNIFICANT CHANGE UP
-  IMIPENEM: SIGNIFICANT CHANGE UP
-  LEVOFLOXACIN: SIGNIFICANT CHANGE UP
-  MEROPENEM: SIGNIFICANT CHANGE UP
-  NITROFURANTOIN: SIGNIFICANT CHANGE UP
-  PIPERACILLIN/TAZOBACTAM: SIGNIFICANT CHANGE UP
-  TIGECYCLINE: SIGNIFICANT CHANGE UP
-  TOBRAMYCIN: SIGNIFICANT CHANGE UP
-  TRIMETHOPRIM/SULFAMETHOXAZOLE: SIGNIFICANT CHANGE UP
ALBUMIN SERPL ELPH-MCNC: 3.7 G/DL — SIGNIFICANT CHANGE UP (ref 3.3–5)
ALP SERPL-CCNC: 44 U/L — SIGNIFICANT CHANGE UP (ref 40–120)
ALT FLD-CCNC: 11 U/L — SIGNIFICANT CHANGE UP (ref 10–45)
ANION GAP SERPL CALC-SCNC: 7 MMOL/L — SIGNIFICANT CHANGE UP (ref 5–17)
AST SERPL-CCNC: 16 U/L — SIGNIFICANT CHANGE UP (ref 10–40)
BILIRUB SERPL-MCNC: 0.3 MG/DL — SIGNIFICANT CHANGE UP (ref 0.2–1.2)
BUN SERPL-MCNC: 6 MG/DL — LOW (ref 7–23)
CALCIUM SERPL-MCNC: 8.6 MG/DL — SIGNIFICANT CHANGE UP (ref 8.4–10.5)
CHLORIDE SERPL-SCNC: 107 MMOL/L — SIGNIFICANT CHANGE UP (ref 96–108)
CO2 SERPL-SCNC: 26 MMOL/L — SIGNIFICANT CHANGE UP (ref 22–31)
CREAT SERPL-MCNC: 0.71 MG/DL — SIGNIFICANT CHANGE UP (ref 0.5–1.3)
CULTURE RESULTS: SIGNIFICANT CHANGE UP
GLUCOSE SERPL-MCNC: 89 MG/DL — SIGNIFICANT CHANGE UP (ref 70–99)
HCT VFR BLD CALC: 33.9 % — LOW (ref 34.5–45)
HGB BLD-MCNC: 11.2 G/DL — LOW (ref 11.5–15.5)
LITHIUM SERPL-MCNC: 0.39 MMOL/L — LOW (ref 0.6–1.2)
MAGNESIUM SERPL-MCNC: 1.9 MG/DL — SIGNIFICANT CHANGE UP (ref 1.6–2.6)
MCHC RBC-ENTMCNC: 29.9 PG — SIGNIFICANT CHANGE UP (ref 27–34)
MCHC RBC-ENTMCNC: 33 GM/DL — SIGNIFICANT CHANGE UP (ref 32–36)
MCV RBC AUTO: 90.4 FL — SIGNIFICANT CHANGE UP (ref 80–100)
METHOD TYPE: SIGNIFICANT CHANGE UP
NRBC # BLD: 0 /100 WBCS — SIGNIFICANT CHANGE UP (ref 0–0)
ORGANISM # SPEC MICROSCOPIC CNT: SIGNIFICANT CHANGE UP
ORGANISM # SPEC MICROSCOPIC CNT: SIGNIFICANT CHANGE UP
PHOSPHATE SERPL-MCNC: 3.3 MG/DL — SIGNIFICANT CHANGE UP (ref 2.5–4.5)
PLATELET # BLD AUTO: 198 K/UL — SIGNIFICANT CHANGE UP (ref 150–400)
POTASSIUM SERPL-MCNC: 3.8 MMOL/L — SIGNIFICANT CHANGE UP (ref 3.5–5.3)
POTASSIUM SERPL-SCNC: 3.8 MMOL/L — SIGNIFICANT CHANGE UP (ref 3.5–5.3)
PROT SERPL-MCNC: 5.8 G/DL — LOW (ref 6–8.3)
RBC # BLD: 3.75 M/UL — LOW (ref 3.8–5.2)
RBC # FLD: 12.8 % — SIGNIFICANT CHANGE UP (ref 10.3–14.5)
SODIUM SERPL-SCNC: 140 MMOL/L — SIGNIFICANT CHANGE UP (ref 135–145)
SPECIMEN SOURCE: SIGNIFICANT CHANGE UP
WBC # BLD: 7.04 K/UL — SIGNIFICANT CHANGE UP (ref 3.8–10.5)
WBC # FLD AUTO: 7.04 K/UL — SIGNIFICANT CHANGE UP (ref 3.8–10.5)

## 2021-02-16 PROCEDURE — 99223 1ST HOSP IP/OBS HIGH 75: CPT

## 2021-02-16 PROCEDURE — 99233 SBSQ HOSP IP/OBS HIGH 50: CPT | Mod: GC

## 2021-02-16 RX ORDER — LITHIUM CARBONATE 300 MG/1
150 TABLET, EXTENDED RELEASE ORAL AT BEDTIME
Refills: 0 | Status: DISCONTINUED | OUTPATIENT
Start: 2021-02-16 | End: 2021-02-17

## 2021-02-16 RX ORDER — NICOTINE POLACRILEX 2 MG
1 GUM BUCCAL DAILY
Refills: 0 | Status: DISCONTINUED | OUTPATIENT
Start: 2021-02-16 | End: 2021-02-19

## 2021-02-16 RX ADMIN — SODIUM CHLORIDE 150 MILLILITER(S): 9 INJECTION INTRAMUSCULAR; INTRAVENOUS; SUBCUTANEOUS at 01:36

## 2021-02-16 RX ADMIN — LITHIUM CARBONATE 150 MILLIGRAM(S): 300 TABLET, EXTENDED RELEASE ORAL at 22:01

## 2021-02-16 RX ADMIN — Medication 1 PATCH: at 23:16

## 2021-02-16 NOTE — PROGRESS NOTE ADULT - PROBLEM SELECTOR PLAN 4
IVF: NS at 150cc/h  E: Replete K<4 and Mg<2  GI: None  DVT: None  Diet: Regular  Code: FULL CODE  Dispo: [facility]  1:1 Observation IVF: None  E: Replete K<4 and Mg<2  GI: None  DVT: None  Diet: Regular  Code: FULL CODE  Dispo: [facility] stepdown to [facility]  1:1 Observation

## 2021-02-16 NOTE — BEHAVIORAL HEALTH ASSESSMENT NOTE - HPI (INCLUDE ILLNESS QUALITY, SEVERITY, DURATION, TIMING, CONTEXT, MODIFYING FACTORS, ASSOCIATED SIGNS AND SYMPTOMS)
Patient is a single, domiciled, unemployed, student originally from [facility] with no significant medical history. Psychiatric hx significant for diagnoses including bipolar disorder II, alcohol use disorder, previous dx of depression and borderline personality disorder, she has been [place] 7 times with one inpt psychiatric hospitalization. Psychiatric hx is significant for completed suicide of maternal uncle, maternal grandfather, and maternal great grandmother. Reports trauma hx and self-diagnoses PTSD. Pt is currently in treatment with team in [place] and has been compliant with new medication regimen of Seroquel 100mg/Lithium 300mg daily x6 months. Patient is transferred from [facility] after presenting with overdose of alcohol ~6 drinks, ~12 sertraline, ~6 lithium and unknown number of ambien. Friend reported that pt had been drinking, went to the bathroom and returned stating that she was worried that she might die because she took pills.  Psych consulted for suicide attempt and dispo.   Patient seen at bedside, 1:1 present. Patient states "I made a very impulsive decision when I was drinking.' Patient states that she is originally from [place], [place], she met someone on a dating site who lives in [facility] last month and impulsively came to [facility] for a two week stay on Wednesday to stay with the person. She reports that immediately after meeting him, she realized that it was a mistake as he was still talking to his ex-gf and talking to numerous other women on dating sites. She began to drink alcohol including Gin on Wednesday, after having abstained from etoh for 9 months prior. She continued to drink daily since arrival. They had an argument on Sunday which quickly escalated at which point pt states that she continued 'chugging Gin' and took her medications in an impulsive suicide attempt. She immediately told her male friend who took her to [facility]. She acknowledges that it was an attempt, which she regrets and feels remorseful about.  She reports a hx of having SI in the past with no intent or plan. Does have a hx of self-injurious behaviors (hitting self/cutting self) as a teen. No hx of aggression towards others.  States that she stopped College classes over a year ago due to her alcohol use (was studying chemical engineering), has since attended a couple of AA meetings and moved back in with her family during the pandemic. Is not currently working. Was correctly diagnosed with BAD II several months ago after being initially dx with depression and borderline personality disorders and she feels that current regimen is very helpful, keeping her mood stabilized and level. Before, she was hypomanic with difficulty sleeping.   Reports hx of multiple substance use recreationally in the past including benzos (xanax, klonopin), cocaine, marijuana. Last used cocaine on Wednesday. Denies hx of a/v h or paranoid ideations, but reports she has had these in context of substance use.    @[id number] Phone call placed to [phone] [facility] NP Prachi Delgado- awaiting callback for collateral   -Pt declines collateral from family members or male friend in [place] Patient is a single, domiciled, unemployed, 21y/o student originally from [facility] with no significant medical history. Psychiatric hx significant for diagnoses including bipolar disorder II, alcohol use disorder, previous dx of depression and borderline personality disorder, she has been [place] 7 times with one inpt psychiatric hospitalization. Psychiatric hx is significant for completed suicide of maternal uncle, maternal grandfather, and maternal great grandmother. Reports trauma hx and self-diagnoses PTSD. Pt is currently in treatment with team in [place] and has been compliant with new medication regimen of Seroquel 100mg/Lithium 300mg daily x6 months. Patient is transferred from [facility] after presenting with overdose of alcohol ~6 drinks, ~12 sertraline, ~6 lithium and unknown number of ambien. Friend reported that pt had been drinking, went to the bathroom and returned stating that she was worried that she might die because she took pills.  Psych consulted for suicide attempt and dispo.   Patient seen at bedside, 1:1 present. Patient states "I made a very impulsive decision when I was drinking.' Patient states that she is originally from [place], [place], she met someone on a dating site who lives in [facility] last month and impulsively came to [facility] for a two week stay on Wednesday to stay with the person. She reports that immediately after meeting him, she realized that it was a mistake as he was still talking to his ex-gf and talking to numerous other women on dating sites. She began to drink alcohol including Gin on Wednesday, after having abstained from etoh for 9 months prior. She continued to drink daily since arrival. They had an argument on Sunday which quickly escalated at which point pt states that she continued 'chugging Gin' and took her medications in an impulsive suicide attempt. She immediately told her male friend who took her to [facility]. She acknowledges that it was an attempt, which she regrets and feels remorseful about.  She reports a hx of having SI in the past with no intent or plan. Does have a hx of self-injurious behaviors (hitting self/cutting self) as a teen. No hx of aggression towards others.  States that she stopped College classes over a year ago due to her alcohol use (was studying chemical engineering), has since attended a couple of AA meetings and moved back in with her family during the pandemic. Is not currently working. Was correctly diagnosed with BAD II several months ago after being initially dx with depression and borderline personality disorders and she feels that current regimen is very helpful, keeping her mood stabilized and level. Before, she was hypomanic with difficulty sleeping. Currently both sleep and appetite are fair, and she report using ambien occasionally.  Reports hx of multiple substance use recreationally in the past including benzos (xanax, klonopin), cocaine, marijuana. Last used cocaine on Wednesday. Denies hx of a/v h or paranoid ideations, but reports she has had these in context of substance use.    @[id number] Phone call placed to [phone] [facility] NP Prachi Delgado- awaiting callback for collateral   -Pt declines collateral being obtained from family members or male friend in [place]

## 2021-02-16 NOTE — BEHAVIORAL HEALTH ASSESSMENT NOTE - SUMMARY
Patient is a single, domiciled, unemployed, 21y/o student originally from [place] with no significant medical history. Psychiatric hx significant for diagnoses including bipolar disorder II, alcohol use disorder, previous dx of depression and borderline personality disorder, she has been [place] 7 times with one inpt psychiatric hospitalization. Psychiatric hx is significant for completed suicide of maternal uncle, maternal grandfather, and maternal great grandmother as well as significant family hx of alcohol abuse/dependence. Pt reports trauma hx and self-diagnoses PTSD. Pt is currently in treatment with team in [place] and has been compliant with new medication regimen of Seroquel 100mg/Lithium 300mg daily x6 months since being correctly diagnosed with BAD II. Patient is transferred from [facility] after presenting with overdose of alcohol, ~12 sertraline, ~6 lithium and unknown number of ambien.    Patient acknowledges that this was a suicide attempt which was done impulsively and which she now regrets. She also acknowledges that her actions of coming to [place] to meet a male suitor was also impulsive in nature. Though pt is remorseful and feels regret over her recent decisions, she remains at elevated risk for SI d/t recent attempt, impulsivity, strong family history of suicide and alcohol use. Recommend inpt hospitalization for stabilization.    Plan  -Recommend inpt hospitalization Patient is a single, domiciled, unemployed, 19y/o student originally from [facility] with no significant medical history. Psychiatric hx significant for diagnoses including bipolar disorder II, alcohol use disorder, previous dx of depression and borderline personality disorder, she has been [place] 7 times with one inpt psychiatric hospitalization. Psychiatric hx is significant for completed suicide of maternal uncle, maternal grandfather, and maternal great grandmother as well as significant family hx of alcohol abuse/dependence. Pt reports trauma hx and self-diagnoses PTSD. Pt is currently in treatment with team in [place] and has been compliant with new medication regimen of Seroquel 100mg/Lithium 300mg daily x6 months since being correctly diagnosed with BAD II. Patient is transferred from [facility] after presenting with overdose of alcohol, ~12 sertraline, ~6 lithium and unknown number of ambien.    Patient acknowledges that this was a suicide attempt which was done impulsively and which she now regrets. She also acknowledges that her actions of coming to [place] to meet a male suitor was also impulsive in nature. Though pt is remorseful and feels regret over her recent decisions, she remains at elevated risk for SI d/t recent attempt, impulsivity, strong family history of suicide and alcohol use. Recommend inpt hospitalization for stabilization.    Plan  -Recommend inpt hospitalization  -Recommend Lithium 150mg qhs to restart for continued stabilized, levels are appropriate

## 2021-02-16 NOTE — BEHAVIORAL HEALTH ASSESSMENT NOTE - RISK ASSESSMENT
Pt at elevated risk for suicide d/t strong family hx, recent attempt, alcohol use and impulsivity. Moderate Acute Suicide Risk

## 2021-02-16 NOTE — BEHAVIORAL HEALTH ASSESSMENT NOTE - DETAILS
Family hx of schizophrenia, bipolar, 3 suicides on maternal side, pts uncle, grandfather and greatgrandmother Strong family hx- maternal side of alcohol use see above

## 2021-02-16 NOTE — PROGRESS NOTE ADULT - PROBLEM SELECTOR PLAN 2
Pt has known hx of BPD, Type 2. Takes Lithium and Sertraline as prescribed by Psychiatrist Dr. Garcia at [facility] in [place], [place]. [phone].  - Collateral on 2/16 after holiday weekend; inquire as to why pt is on SSRI despite frequent suicidal ideation  - f/u Psych recommendations regarding when to start back Lithium therapy/whether or not to taper off SSRI given that pt is s/p possible suicide attempt Pt has known hx of BPD, Type 2. Takes Lithium and Sertraline as prescribed by Psychiatrist Dr. Garcia at [facility] in [place], [place]. [phone].  - Psychiatry consulted, recommending inpatient psychiatric care at this time  - Per Psych, restart Lithium 150mg qHS tonight 2/16

## 2021-02-16 NOTE — PROGRESS NOTE ADULT - SUBJECTIVE AND OBJECTIVE BOX
OVERNIGHT EVENTS:    SUBJECTIVE / INTERVAL HPI: Patient seen and examined at bedside.     VITAL SIGNS:  Vital Signs Last 24 Hrs  T(C): 37.1 (2021 06:00), Max: 37.2 (15 Feb 2021 10:02)  T(F): 98.8 (2021 06:00), Max: 99 (15 Feb 2021 10:02)  HR: 64 (2021 04:18) (64 - 99)  BP: 103/60 (2021 04:18) (103/60 - 122/68)  BP(mean): 75 (2021 04:18) (75 - 89)  RR: 16 (2021 04:18) (16 - 18)  SpO2: 96% (2021 04:18) (94% - 98%)    PHYSICAL EXAM:    General: WDWN  HEENT: NC/AT; PERRL, anicteric sclera; MMM  Neck: supple  Cardiovascular: +S1/S2, RRR  Respiratory: CTA B/L; no W/R/R  Gastrointestinal: soft, NT/ND; +BSx4  Extremities: WWP; no edema, clubbing or cyanosis  Vascular: 2+ radial, DP/PT pulses B/L  Neurological: AAOx3; no focal deficits    MEDICATIONS:  MEDICATIONS  (STANDING):  potassium chloride    Tablet ER 10 milliEquivalent(s) Oral once  sodium chloride 0.9%. 1000 milliLiter(s) (150 mL/Hr) IV Continuous <Continuous>    MEDICATIONS  (PRN):      ALLERGIES:  Allergies    No Known Allergies    Intolerances        LABS:                        11.2   7.04  )-----------( 198      ( 2021 06:18 )             33.9     02-16    140  |  107  |  6<L>  ----------------------------<  89  3.8   |  26  |  0.71    Ca    8.6      2021 06:18  Phos  3.3     02-16  Mg     1.9     02-16    TPro  5.8<L>  /  Alb  3.7  /  TBili  0.3  /  DBili  x   /  AST  16  /  ALT  11  /  AlkPhos  44  02-16    PT/INR - ( 2021 21:10 )   PT: 12.7 sec;   INR: 1.06          PTT - ( 2021 21:10 )  PTT:28.9 sec  Urinalysis Basic - ( 2021 23:01 )    Color: Yellow / Appearance: Clear / S.010 / pH: x  Gluc: x / Ketone: NEGATIVE  / Bili: NEGATIVE / Urobili: 0.2 E.U./dL   Blood: x / Protein: NEGATIVE mg/dL / Nitrite: NEGATIVE   Leuk Esterase: Trace / RBC: < 5 /HPF / WBC < 5 /HPF   Sq Epi: x / Non Sq Epi: Moderate /HPF / Bacteria: Many /HPF      CAPILLARY BLOOD GLUCOSE          RADIOLOGY & ADDITIONAL TESTS: Reviewed. OVERNIGHT EVENTS: Lithium level decreased to 0.65, then 0.39 overnight. No EKG changes since prior study. LUISITO otherwise.    SUBJECTIVE / INTERVAL HPI: Patient seen and examined at bedside. Feels overall well and denies feelings of depression, suicidal ideation, intention to self-harm. Denies abdominal pain, nausea/vomiting, headache, loss of consciousness, dizziness, chest pain, palpitations, diarrhea.     *Patient was asked on 2 separate occasions if she wishes to involve her mother in her care, and she responded that she DOES want her mother, Shilpi, to be involved in her care/discuss her case with the Psychiatry and Primary teams*    VITAL SIGNS:  Vital Signs Last 24 Hrs  T(C): 37.1 (2021 06:00), Max: 37.2 (15 Feb 2021 10:02)  T(F): 98.8 (2021 06:00), Max: 99 (15 Feb 2021 10:02)  HR: 64 (2021 04:18) (64 - 99)  BP: 103/60 (2021 04:18) (103/60 - 122/68)  BP(mean): 75 (2021 04:18) (75 - 89)  RR: 16 (2021 04:18) (16 - 18)  SpO2: 96% (2021 04:18) (94% - 98%)    PHYSICAL EXAM:    General: Young, amicable female patient with bright pink makeup, in NAD  HEENT: NC/AT; PERRL, anicteric sclera; MMM  Neck: supple  Cardiovascular: +S1/S2, RRR  Respiratory: CTA B/L; no W/R/R, no crackles  Gastrointestinal: soft, NT/ND; +BSx4  Extremities: WWP; no edema, clubbing or cyanosis  Vascular: 2+ radial, DP pulses B/L  Neurological: AAOx3; no focal deficits    MEDICATIONS:  MEDICATIONS  (STANDING):  potassium chloride    Tablet ER 10 milliEquivalent(s) Oral once  sodium chloride 0.9%. 1000 milliLiter(s) (150 mL/Hr) IV Continuous <Continuous>    MEDICATIONS  (PRN):      ALLERGIES:  Allergies    No Known Allergies    Intolerances        LABS:                        11.2   7.04  )-----------( 198      ( 2021 06:18 )             33.9     02-16    140  |  107  |  6<L>  ----------------------------<  89  3.8   |  26  |  0.71    Ca    8.6      2021 06:18  Phos  3.3     02-16  Mg     1.9     02-16    TPro  5.8<L>  /  Alb  3.7  /  TBili  0.3  /  DBili  x   /  AST  16  /  ALT  11  /  AlkPhos  44  02-16    PT/INR - ( 2021 21:10 )   PT: 12.7 sec;   INR: 1.06          PTT - ( 2021 21:10 )  PTT:28.9 sec  Urinalysis Basic - ( 2021 23:01 )    Color: Yellow / Appearance: Clear / S.010 / pH: x  Gluc: x / Ketone: NEGATIVE  / Bili: NEGATIVE / Urobili: 0.2 E.U./dL   Blood: x / Protein: NEGATIVE mg/dL / Nitrite: NEGATIVE   Leuk Esterase: Trace / RBC: < 5 /HPF / WBC < 5 /HPF   Sq Epi: x / Non Sq Epi: Moderate /HPF / Bacteria: Many /HPF      CAPILLARY BLOOD GLUCOSE          RADIOLOGY & ADDITIONAL TESTS: Reviewed. **Transfer Note from [facility] to [facility]**  Hospital Course:  20F with no PMH but PPH of Bipolar Disorder Type 2 on Lithium/Sertraline c/b prior suicidal ideation, presenting after possible suicide attempt by toxic ingestion of Lithium/Sertraline/Ambien pills while intoxicated with alcohol, admitted for observation and detoxification as well as psychiatric evaluation. Given that pt is now medically stable, pt will be stepped down to [facility] for further management.    OVERNIGHT EVENTS: Lithium level decreased to 0.65, then 0.39 overnight. No EKG changes since prior study. LUISITO otherwise.    SUBJECTIVE / INTERVAL HPI: Patient seen and examined at bedside. Feels overall well and denies feelings of depression, suicidal ideation, intention to self-harm. Denies abdominal pain, nausea/vomiting, headache, loss of consciousness, dizziness, chest pain, palpitations, diarrhea.     *Patient was asked on 2 separate occasions if she wishes to involve her mother in her care, and she responded that she DOES want her mother, Shilpi, to be involved in her care/discuss her case with the Psychiatry and Primary teams*    VITAL SIGNS:  Vital Signs Last 24 Hrs  T(C): 37.1 (2021 06:00), Max: 37.2 (15 Feb 2021 10:02)  T(F): 98.8 (2021 06:00), Max: 99 (15 Feb 2021 10:02)  HR: 64 (2021 04:18) (64 - 99)  BP: 103/60 (2021 04:18) (103/60 - 122/68)  BP(mean): 75 (2021 04:18) (75 - 89)  RR: 16 (2021 04:18) (16 - 18)  SpO2: 96% (2021 04:18) (94% - 98%)    PHYSICAL EXAM:    General: Young, amicable female patient with bright pink makeup, in NAD  HEENT: NC/AT; PERRL, anicteric sclera; MMM  Neck: supple  Cardiovascular: +S1/S2, RRR  Respiratory: CTA B/L; no W/R/R, no crackles  Gastrointestinal: soft, NT/ND; +BSx4  Extremities: WWP; no edema, clubbing or cyanosis  Vascular: 2+ radial, DP pulses B/L  Neurological: AAOx3; no focal deficits    MEDICATIONS:  MEDICATIONS  (STANDING):  potassium chloride    Tablet ER 10 milliEquivalent(s) Oral once  sodium chloride 0.9%. 1000 milliLiter(s) (150 mL/Hr) IV Continuous <Continuous>    MEDICATIONS  (PRN):      ALLERGIES:  Allergies    No Known Allergies    Intolerances        LABS:                        11.2   7.04  )-----------( 198      ( 2021 06:18 )             33.9     02-16    140  |  107  |  6<L>  ----------------------------<  89  3.8   |  26  |  0.71    Ca    8.6      2021 06:18  Phos  3.3     02-16  Mg     1.9     02-16    TPro  5.8<L>  /  Alb  3.7  /  TBili  0.3  /  DBili  x   /  AST  16  /  ALT  11  /  AlkPhos  44  02-16    PT/INR - ( 2021 21:10 )   PT: 12.7 sec;   INR: 1.06          PTT - ( 2021 21:10 )  PTT:28.9 sec  Urinalysis Basic - ( 2021 23:01 )    Color: Yellow / Appearance: Clear / S.010 / pH: x  Gluc: x / Ketone: NEGATIVE  / Bili: NEGATIVE / Urobili: 0.2 E.U./dL   Blood: x / Protein: NEGATIVE mg/dL / Nitrite: NEGATIVE   Leuk Esterase: Trace / RBC: < 5 /HPF / WBC < 5 /HPF   Sq Epi: x / Non Sq Epi: Moderate /HPF / Bacteria: Many /HPF      CAPILLARY BLOOD GLUCOSE          RADIOLOGY & ADDITIONAL TESTS: Reviewed.

## 2021-02-16 NOTE — PROGRESS NOTE ADULT - PROBLEM SELECTOR PLAN 1
Pt admits to suicidal ideation with "possible" attempt on 2/14 PM (pt unsure if she intended to kill herself as she was intoxicated at the time) by ingestion of Lithium, Sertraline, and Ambien (prescribed to her for treatment of BPD as below). Admits to drinking 10-12 beverages prior to incident as well as tremors, nausea/vomiting last night 2/14, but denies abdominal pain, heart palpitations, dyspnea, chest pain, or headache.  - Toxicology contacted, recommending observation in telemetry with IVF  - Monitor for Serotonin Syndrome, Lithium toxicity  - c/w NS at 150cc/h  - 1:1 Constant Observation  - Monitor Lithium level, ABG, Lactate  - Consult Psychiatry on 2/16 (out of office today 2/15) Pt admits to suicidal ideation with "possible" attempt on 2/14 PM (pt unsure if she intended to kill herself as she was intoxicated at the time) by ingestion of Lithium, Sertraline, and Ambien (prescribed to her for treatment of BPD as below). Admits to drinking 10-12 beverages prior to incident as well as tremors, nausea/vomiting last night 2/14, but denies abdominal pain, heart palpitations, dyspnea, chest pain, or headache.  - Toxicology contacted, recommended observation in telemetry with IVF  - 1:1 Constant Observation  - Lithium levels declined overnight 2/15-2/16  - Restart Lithium per Psych  - Psych recommends inpatient psychiatric care; however, patient's mother Shilpi is flying to [facility] from [place], [place]  - Mother will speak to Dr. Pitts (Psych) about whether or not pt would be safe to discharge with instructions to f/u with psychiatric care in [place]  - f/u Psych recs
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
## 2021-02-17 LAB
ALBUMIN SERPL ELPH-MCNC: 4.3 G/DL — SIGNIFICANT CHANGE UP (ref 3.3–5)
ALP SERPL-CCNC: 47 U/L — SIGNIFICANT CHANGE UP (ref 40–120)
ALT FLD-CCNC: 18 U/L — SIGNIFICANT CHANGE UP (ref 10–45)
ANION GAP SERPL CALC-SCNC: 12 MMOL/L — SIGNIFICANT CHANGE UP (ref 5–17)
AST SERPL-CCNC: 19 U/L — SIGNIFICANT CHANGE UP (ref 10–40)
BILIRUB SERPL-MCNC: 0.4 MG/DL — SIGNIFICANT CHANGE UP (ref 0.2–1.2)
BUN SERPL-MCNC: 10 MG/DL — SIGNIFICANT CHANGE UP (ref 7–23)
CALCIUM SERPL-MCNC: 9.9 MG/DL — SIGNIFICANT CHANGE UP (ref 8.4–10.5)
CHLORIDE SERPL-SCNC: 102 MMOL/L — SIGNIFICANT CHANGE UP (ref 96–108)
CO2 SERPL-SCNC: 27 MMOL/L — SIGNIFICANT CHANGE UP (ref 22–31)
CREAT SERPL-MCNC: 0.75 MG/DL — SIGNIFICANT CHANGE UP (ref 0.5–1.3)
GLUCOSE SERPL-MCNC: 87 MG/DL — SIGNIFICANT CHANGE UP (ref 70–99)
HCT VFR BLD CALC: 40.1 % — SIGNIFICANT CHANGE UP (ref 34.5–45)
HGB BLD-MCNC: 13.3 G/DL — SIGNIFICANT CHANGE UP (ref 11.5–15.5)
MAGNESIUM SERPL-MCNC: 2 MG/DL — SIGNIFICANT CHANGE UP (ref 1.6–2.6)
MCHC RBC-ENTMCNC: 30.4 PG — SIGNIFICANT CHANGE UP (ref 27–34)
MCHC RBC-ENTMCNC: 33.2 GM/DL — SIGNIFICANT CHANGE UP (ref 32–36)
MCV RBC AUTO: 91.8 FL — SIGNIFICANT CHANGE UP (ref 80–100)
NRBC # BLD: 0 /100 WBCS — SIGNIFICANT CHANGE UP (ref 0–0)
PHOSPHATE SERPL-MCNC: 4.2 MG/DL — SIGNIFICANT CHANGE UP (ref 2.5–4.5)
PLATELET # BLD AUTO: 247 K/UL — SIGNIFICANT CHANGE UP (ref 150–400)
POTASSIUM SERPL-MCNC: 3.9 MMOL/L — SIGNIFICANT CHANGE UP (ref 3.5–5.3)
POTASSIUM SERPL-SCNC: 3.9 MMOL/L — SIGNIFICANT CHANGE UP (ref 3.5–5.3)
PROT SERPL-MCNC: 6.6 G/DL — SIGNIFICANT CHANGE UP (ref 6–8.3)
RBC # BLD: 4.37 M/UL — SIGNIFICANT CHANGE UP (ref 3.8–5.2)
RBC # FLD: 12.9 % — SIGNIFICANT CHANGE UP (ref 10.3–14.5)
SODIUM SERPL-SCNC: 141 MMOL/L — SIGNIFICANT CHANGE UP (ref 135–145)
WBC # BLD: 7.93 K/UL — SIGNIFICANT CHANGE UP (ref 3.8–10.5)
WBC # FLD AUTO: 7.93 K/UL — SIGNIFICANT CHANGE UP (ref 3.8–10.5)

## 2021-02-17 PROCEDURE — 99233 SBSQ HOSP IP/OBS HIGH 50: CPT

## 2021-02-17 PROCEDURE — 99232 SBSQ HOSP IP/OBS MODERATE 35: CPT | Mod: GC

## 2021-02-17 RX ORDER — LITHIUM CARBONATE 300 MG/1
150 TABLET, EXTENDED RELEASE ORAL EVERY 12 HOURS
Refills: 0 | Status: DISCONTINUED | OUTPATIENT
Start: 2021-02-17 | End: 2021-02-19

## 2021-02-17 RX ADMIN — Medication 1 PATCH: at 13:13

## 2021-02-17 RX ADMIN — Medication 1 PATCH: at 06:20

## 2021-02-17 RX ADMIN — Medication 1 PATCH: at 23:00

## 2021-02-17 RX ADMIN — Medication 1 PATCH: at 19:05

## 2021-02-17 RX ADMIN — LITHIUM CARBONATE 150 MILLIGRAM(S): 300 TABLET, EXTENDED RELEASE ORAL at 22:01

## 2021-02-17 RX ADMIN — Medication 10 MILLIEQUIVALENT(S): at 13:13

## 2021-02-17 NOTE — PROGRESS NOTE ADULT - SUBJECTIVE AND OBJECTIVE BOX
**Transfer Note from [facility] to [facility]**  Hospital Course:  20F with no PMH but PPH of Bipolar Disorder Type 2 on Lithium/Sertraline c/b prior suicidal ideation, presenting after possible suicide attempt by toxic ingestion of Lithium/Sertraline/Ambien pills while intoxicated with alcohol, admitted for observation and detoxification as well as psychiatric evaluation. Given that pt is now medically stable, pt will be stepped down to [facility] for further management.    OVERNIGHT EVENTS: No acute events overnight    SUBJECTIVE / INTERVAL HPI: Patient seen and examined at bedside. Feels overall well and denies feelings of depression, suicidal ideation, intention to self-harm. No acute complaints; no abdominal pain, chest pain, shortness of breath, nausea, vomiting. Discussed with her that her mother was coming from [place]. Asked whether that was good thing and she says yes.    VITAL SIGNS:  Vital Signs Last 24 Hrs  T(C): 36.4 (17 Feb 2021 06:33), Max: 37.6 (16 Feb 2021 14:14)  T(F): 97.5 (17 Feb 2021 06:33), Max: 99.6 (16 Feb 2021 14:14)  HR: 50 (17 Feb 2021 04:10) (50 - 86)  BP: 103/56 (17 Feb 2021 04:10) (103/56 - 117/67)  BP(mean): 74 (17 Feb 2021 04:10) (74 - 86)  RR: 18 (17 Feb 2021 04:10) (18 - 19)  SpO2: --    PHYSICAL EXAM:    General: Young, appearing well with done up makeup in NAD  HEENT: NC/AT; PERRL, anicteric sclera; MMM  Neck: supple  Cardiovascular: +S1/S2, RRR  Respiratory: CTA B/L; no W/R/R, no crackles  Gastrointestinal: soft, NT/ND; +BSx4  Extremities: WWP; no edema, clubbing or cyanosis  Vascular: 2+ radial, DP pulses B/L  Neurological: AAOx3; no focal deficits      MEDICATIONS:  MEDICATIONS  (STANDING):  lithium 150 milliGRAM(s) Oral at bedtime  nicotine -  14 mG/24Hr(s) Patch 1 patch Transdermal daily  potassium chloride    Tablet ER 10 milliEquivalent(s) Oral once    MEDICATIONS  (PRN):      ALLERGIES:  Allergies    No Known Allergies    Intolerances        LABS:                        13.3   7.93  )-----------( 247      ( 17 Feb 2021 07:30 )             40.1     02-16    140  |  107  |  6<L>  ----------------------------<  89  3.8   |  26  |  0.71    Ca    8.6      16 Feb 2021 06:18  Phos  3.3     02-16  Mg     1.9     02-16    TPro  5.8<L>  /  Alb  3.7  /  TBili  0.3  /  DBili  x   /  AST  16  /  ALT  11  /  AlkPhos  44  02-16        CAPILLARY BLOOD GLUCOSE          RADIOLOGY & ADDITIONAL TESTS: Reviewed.

## 2021-02-17 NOTE — PROGRESS NOTE ADULT - PROBLEM SELECTOR PLAN 1
Pt admits to suicidal ideation with "possible" attempt on 2/14 PM (pt unsure if she intended to kill herself as she was intoxicated at the time) by ingestion of Lithium, Sertraline, and Ambien (prescribed to her for treatment of BPD as below). Admits to drinking 10-12 beverages prior to incident as well as tremors, nausea/vomiting last night 2/14, but denies abdominal pain, heart palpitations, dyspnea, chest pain, or headache.  - Toxicology contacted, recommended observation in telemetry with IVF  - 1:1 Constant Observation  - Lithium levels declined overnight 2/15-2/16  - Restart Lithium per Psych  - Psych recommends inpatient psychiatric care; however, patient's mother Shilpi is flying to [facility] from [place], [place]  - Mother will speak to Dr. Pitts (Psych) about whether or not pt would be safe to discharge with instructions to f/u with psychiatric care in [place]  - f/u Psych recs

## 2021-02-17 NOTE — PROGRESS NOTE BEHAVIORAL HEALTH - SUMMARY
Patient is a single, domiciled, unemployed, 21y/o student originally from [facility] with no significant medical history. Psychiatric hx significant for diagnoses including bipolar disorder II, alcohol use disorder, previous dx of depression and borderline personality disorder, she has been [place] 7 times with one inpt psychiatric hospitalization. Psychiatric hx is significant for completed suicide of maternal uncle, maternal grandfather, and maternal great grandmother as well as significant family hx of alcohol abuse/dependence. Pt reports trauma hx and self-diagnoses PTSD. Pt is currently in treatment with team in [place] and has been compliant with new medication regimen of Seroquel 100mg/Lithium 300mg daily x6 months since being correctly diagnosed with BAD II. Patient is transferred from [facility] after presenting with overdose of alcohol, ~12 sertraline, ~6 lithium and unknown number of ambien.    Patient is future oriented, continues to express remorse and regret for recent attempt. Denying current si/hi, a/v h or PI. Has restarted Lithium at 150mg daily.  Recommend inpt hospitalization for stabilization.    Plan  -Recommend inpt hospitalization  -Recommend Lithium 150mg BID for continued stabilized, levels are appropriate Patient is a single, domiciled, unemployed, 21y/o student originally from [facility] with no significant medical history. Psychiatric hx significant for diagnoses including bipolar disorder II, alcohol use disorder, previous dx of depression and borderline personality disorder, she has been [place] 7 times with one inpt psychiatric hospitalization. Psychiatric hx is significant for completed suicide of maternal uncle, maternal grandfather, and maternal great grandmother as well as significant family hx of alcohol abuse/dependence. Pt reports trauma hx and self-diagnoses PTSD. Pt is currently in treatment with team in [place] and has been compliant with new medication regimen of Seroquel 100mg/Lithium 300mg daily x6 months since being correctly diagnosed with BAD II. Patient is transferred from [facility] after presenting with overdose of alcohol, ~12 sertraline, ~6 lithium and unknown number of ambien.    Patient is future oriented, continues to express remorse and regret for recent attempt. Denying current si/hi, a/v h or PI. Patient was restarted on lithium, today titrated to 150mg bid. Patient is currently requesting to be discharged to return to [place]; patient;s mom flew in [place] today.       Plan  -Recommend inpt hospitalization vs flying back to [place] with patient's mother where patient resources for mental health  -Recommend Lithium 150mg BID for continued stabilized, levels are appropriate

## 2021-02-17 NOTE — PROGRESS NOTE BEHAVIORAL HEALTH - NSBHFUPINTERVALHXFT_PSY_A_CORE
Patient seen at bedside, 1:1 present. She report feeling 'calm' today, had a good night overnight, good sleep and appetite and is anticipating arrival of her mother from [facility] this afternoon. Denies any si/hi a/v h or paranoid ideations and is future oriented. Continues to express regret over suicide attempt and relapse of alcohol after 9 months abstaining. She discussed the difficulty of just having one drink of alcohol, instead usually having multiple drinks. We discussed resources, support, as well as medications such as disulfiram and naltrexone

## 2021-02-17 NOTE — PROGRESS NOTE ADULT - PROBLEM SELECTOR PLAN 2
Pt has known hx of BPD, Type 2. Takes Lithium and Sertraline as prescribed by Psychiatrist Dr. Garcia at [facility] in [place], [place]. [phone].  - Psychiatry consulted, recommending inpatient psychiatric care at this time  - Per Psych, restart Lithium 150mg qHS tonight 2/16 Pt has known hx of BPD, Type 2. Takes Lithium and Sertraline as prescribed by Psychiatrist Dr. Garcia at [facility] in [place], [place]. [phone].  - Psychiatry consulted, recommending inpatient psychiatric care at this time  - Per Psych, restart Lithium 150mg qHS 2/16  - Increased to BID (150mg) dosing 2/17

## 2021-02-17 NOTE — PROGRESS NOTE ADULT - PROBLEM SELECTOR PLAN 4
IVF: None  E: Replete K<4 and Mg<2  GI: None  DVT: None  Diet: Regular  Code: FULL CODE  Dispo: [facility] stepdown to [facility]  1:1 Observation

## 2021-02-18 DIAGNOSIS — F17.200 NICOTINE DEPENDENCE, UNSPECIFIED, UNCOMPLICATED: ICD-10-CM

## 2021-02-18 DIAGNOSIS — R82.71 BACTERIURIA: ICD-10-CM

## 2021-02-18 LAB
ANION GAP SERPL CALC-SCNC: 11 MMOL/L — SIGNIFICANT CHANGE UP (ref 5–17)
BUN SERPL-MCNC: 15 MG/DL — SIGNIFICANT CHANGE UP (ref 7–23)
CALCIUM SERPL-MCNC: 9.6 MG/DL — SIGNIFICANT CHANGE UP (ref 8.4–10.5)
CHLORIDE SERPL-SCNC: 101 MMOL/L — SIGNIFICANT CHANGE UP (ref 96–108)
CO2 SERPL-SCNC: 27 MMOL/L — SIGNIFICANT CHANGE UP (ref 22–31)
CREAT SERPL-MCNC: 0.76 MG/DL — SIGNIFICANT CHANGE UP (ref 0.5–1.3)
GLUCOSE SERPL-MCNC: 81 MG/DL — SIGNIFICANT CHANGE UP (ref 70–99)
HCT VFR BLD CALC: 41.8 % — SIGNIFICANT CHANGE UP (ref 34.5–45)
HGB BLD-MCNC: 13.8 G/DL — SIGNIFICANT CHANGE UP (ref 11.5–15.5)
LITHIUM SERPL-MCNC: 0.2 MMOL/L — LOW (ref 0.6–1.2)
MAGNESIUM SERPL-MCNC: 2 MG/DL — SIGNIFICANT CHANGE UP (ref 1.6–2.6)
MCHC RBC-ENTMCNC: 29.9 PG — SIGNIFICANT CHANGE UP (ref 27–34)
MCHC RBC-ENTMCNC: 33 GM/DL — SIGNIFICANT CHANGE UP (ref 32–36)
MCV RBC AUTO: 90.5 FL — SIGNIFICANT CHANGE UP (ref 80–100)
NRBC # BLD: 0 /100 WBCS — SIGNIFICANT CHANGE UP (ref 0–0)
PHOSPHATE SERPL-MCNC: 4.9 MG/DL — HIGH (ref 2.5–4.5)
PLATELET # BLD AUTO: 261 K/UL — SIGNIFICANT CHANGE UP (ref 150–400)
POTASSIUM SERPL-MCNC: 3.7 MMOL/L — SIGNIFICANT CHANGE UP (ref 3.5–5.3)
POTASSIUM SERPL-SCNC: 3.7 MMOL/L — SIGNIFICANT CHANGE UP (ref 3.5–5.3)
RBC # BLD: 4.62 M/UL — SIGNIFICANT CHANGE UP (ref 3.8–5.2)
RBC # FLD: 12.8 % — SIGNIFICANT CHANGE UP (ref 10.3–14.5)
SODIUM SERPL-SCNC: 139 MMOL/L — SIGNIFICANT CHANGE UP (ref 135–145)
WBC # BLD: 8.77 K/UL — SIGNIFICANT CHANGE UP (ref 3.8–10.5)
WBC # FLD AUTO: 8.77 K/UL — SIGNIFICANT CHANGE UP (ref 3.8–10.5)

## 2021-02-18 PROCEDURE — 87086 URINE CULTURE/COLONY COUNT: CPT

## 2021-02-18 PROCEDURE — 85610 PROTHROMBIN TIME: CPT

## 2021-02-18 PROCEDURE — 81001 URINALYSIS AUTO W/SCOPE: CPT

## 2021-02-18 PROCEDURE — 80178 ASSAY OF LITHIUM: CPT

## 2021-02-18 PROCEDURE — 93005 ELECTROCARDIOGRAM TRACING: CPT | Mod: 76

## 2021-02-18 PROCEDURE — 83605 ASSAY OF LACTIC ACID: CPT

## 2021-02-18 PROCEDURE — G0378: CPT

## 2021-02-18 PROCEDURE — U0005: CPT

## 2021-02-18 PROCEDURE — 80053 COMPREHEN METABOLIC PANEL: CPT

## 2021-02-18 PROCEDURE — 85027 COMPLETE CBC AUTOMATED: CPT

## 2021-02-18 PROCEDURE — 99285 EMERGENCY DEPT VISIT HI MDM: CPT | Mod: 25

## 2021-02-18 PROCEDURE — 36415 COLL VENOUS BLD VENIPUNCTURE: CPT

## 2021-02-18 PROCEDURE — 84484 ASSAY OF TROPONIN QUANT: CPT

## 2021-02-18 PROCEDURE — 80048 BASIC METABOLIC PNL TOTAL CA: CPT

## 2021-02-18 PROCEDURE — 85730 THROMBOPLASTIN TIME PARTIAL: CPT

## 2021-02-18 PROCEDURE — 82550 ASSAY OF CK (CPK): CPT

## 2021-02-18 PROCEDURE — 87186 SC STD MICRODIL/AGAR DIL: CPT

## 2021-02-18 PROCEDURE — 84100 ASSAY OF PHOSPHORUS: CPT

## 2021-02-18 PROCEDURE — 96374 THER/PROPH/DIAG INJ IV PUSH: CPT

## 2021-02-18 PROCEDURE — 99233 SBSQ HOSP IP/OBS HIGH 50: CPT

## 2021-02-18 PROCEDURE — 71045 X-RAY EXAM CHEST 1 VIEW: CPT

## 2021-02-18 PROCEDURE — 99233 SBSQ HOSP IP/OBS HIGH 50: CPT | Mod: GC

## 2021-02-18 PROCEDURE — 80307 DRUG TEST PRSMV CHEM ANLYZR: CPT

## 2021-02-18 PROCEDURE — 83735 ASSAY OF MAGNESIUM: CPT

## 2021-02-18 PROCEDURE — 84702 CHORIONIC GONADOTROPIN TEST: CPT

## 2021-02-18 PROCEDURE — 82803 BLOOD GASES ANY COMBINATION: CPT

## 2021-02-18 PROCEDURE — 96361 HYDRATE IV INFUSION ADD-ON: CPT

## 2021-02-18 PROCEDURE — 87635 SARS-COV-2 COVID-19 AMP PRB: CPT

## 2021-02-18 RX ADMIN — LITHIUM CARBONATE 150 MILLIGRAM(S): 300 TABLET, EXTENDED RELEASE ORAL at 20:55

## 2021-02-18 RX ADMIN — Medication 1 PATCH: at 06:06

## 2021-02-18 RX ADMIN — LITHIUM CARBONATE 150 MILLIGRAM(S): 300 TABLET, EXTENDED RELEASE ORAL at 09:54

## 2021-02-18 NOTE — PROGRESS NOTE ADULT - PROBLEM SELECTOR PLAN 2
Pt has known hx of BPD, Type 2. Takes Lithium and Sertraline as prescribed by Psychiatrist Dr. Garcia at [facility] in [place], [place]. [phone].  - Psychiatry consulted, pending final dispo recs (inpatient psych v. discharge to seek immediate psychiatric care in [place])  - Per Psych, restarted Lithium 150mg qHS 2/16  - Increased to 150mg BID dosing 2/17  - Monitor daily Lithium levels

## 2021-02-18 NOTE — PROGRESS NOTE BEHAVIORAL HEALTH - SUMMARY
Patient is a single, domiciled, unemployed, 19y/o student originally from [facility] [place] with no significant medical history. Psychiatric hx significant for diagnoses including bipolar disorder II, alcohol use disorder, previous dx of depression and borderline personality disorder, she has been [place] 7 times with one inpt psychiatric hospitalization. Psychiatric hx is significant for completed suicide of maternal uncle, maternal grandfather, and maternal great grandmother as well as significant family hx of alcohol abuse/dependence. Pt reports trauma hx and self-diagnoses PTSD. Pt is currently in treatment with team in [place] and has been compliant with new medication regimen of Seroquel 100mg/Lithium 300mg daily x6 months since being correctly diagnosed with BAD II. Patient is transferred from [facility] after presenting with overdose of alcohol, ~12 sertraline, ~6 lithium and unknown number of ambien.    Patient is future oriented, continues to express remorse and regret for recent attempt. Denying current si/hi, a/v h or PI. Patient was restarted on lithium, today titrated to 150mg bid. Patient is currently requesting to be discharged to return to [place]; patient;s mom flew in [place] today.       Plan  -Recommend inpt hospitalization vs flying back to [place] with patient's mother where patient resources for mental health  -Recommend Lithium 150mg BID for continued stabilized, levels are appropriate Patient is a single, domiciled, unemployed, 19y/o student originally from [facility] with no significant medical history. Psychiatric hx significant for diagnoses including bipolar disorder II, alcohol use disorder, previous dx of depression and borderline personality disorder, she has been [place] 7 times with one inpt psychiatric hospitalization. Psychiatric hx is significant for completed suicide of maternal uncle, maternal grandfather, and maternal great grandmother as well as significant family hx of alcohol abuse/dependence. Pt reports trauma hx and self-diagnoses PTSD. Pt is currently in treatment with team in [place] and has been compliant with new medication regimen of Seroquel 100mg/Lithium 300mg daily x6 months since being correctly diagnosed with BAD II. Patient is transferred from [facility] after presenting with overdose of alcohol, ~12 sertraline, ~6 lithium and unknown number of ambien.      Plan  -Pending collateral from [facility] provider's, pt potentially for discharge this afternoon with return to [facility] and appt next week with provider  -Recommend Lithium 150mg BID. Patient is a single, domiciled, unemployed, 21y/o student originally from [facility] with no significant medical history. Psychiatric hx significant for diagnoses including bipolar disorder II, alcohol use disorder, previous dx of depression and borderline personality disorder, she has been [place] 7 times with one inpt psychiatric hospitalization. Psychiatric hx is significant for completed suicide of maternal uncle, maternal grandfather, and maternal great grandmother as well as significant family hx of alcohol abuse/dependence. Pt reports trauma hx and self-diagnoses PTSD. Pt is currently in treatment with team in [place] and has been compliant with new medication regimen of Seroquel 100mg/Lithium 300mg daily x6 months since being correctly diagnosed with BAD II. Patient is transferred from [facility] after presenting with overdose of alcohol, ~12 sertraline, ~6 lithium and unknown number of ambien.    Patient future oriented, anticipating return to [place], and continuing care. Pt denies si/hi, a/v h or paranoid ideation.    Plan  -Discharge to mom and pt to follow up with provider  -Recommend Lithium 150mg BID.

## 2021-02-18 NOTE — PROGRESS NOTE ADULT - PROBLEM SELECTOR PLAN 6
IVF: None  E: Replete K<4 and Mg<2  GI: None  DVT: None  Diet: Regular  Code: FULL CODE  Dispo: [facility]down to [facility]  1:1 Observation

## 2021-02-18 NOTE — PROGRESS NOTE ADULT - ATTENDING COMMENTS
Awaiting Psych f/u.
Patient seen and examined at bedside. Agree with exam, a/p as above with the following addendum/edits:     Denies suicidal ideation currently. mother has flown in from [place] and will take her back. Setting up f/u w/ her [place] psychiatrist. Appreciate psych assistance for safety of discharge.

## 2021-02-18 NOTE — PROGRESS NOTE BEHAVIORAL HEALTH - NSBHCONSULTFOLLOWDETAILS_PSY_A_CORE FT
inpatient hospitalization pending medical clearance
inpatient hospitalization pending medical clearance

## 2021-02-18 NOTE — PROGRESS NOTE ADULT - PROBLEM SELECTOR PLAN 1
Pt admits to suicidal ideation with "possible" attempt on 2/14 PM (pt unsure if she intended to kill herself as she was intoxicated at the time) by ingestion of Lithium, Sertraline, and Ambien (prescribed to her for treatment of BPD as below). Admits to drinking 10-12 beverages prior to incident as well as tremors, nausea/vomiting last night 2/14, but denies abdominal pain, heart palpitations, dyspnea, chest pain, or headache.  - Pt initially admitted to Telemetry per Toxicology recs, now s/p IVF  - Lactate cleared on Day 1 of admission, Lithium level decreased from 1.90 on admission to 0.35 and now restarted on 2/16 (as described below)  - 1:1 Constant Observation  - Psych recommends inpatient psychiatric care; however, patient's mother Shilpi is flying to [facility] from [place], [place]  - Mother will speak to Dr. Pitts (Psych) about whether or not pt would be safe to discharge with instructions to f/u with psychiatric care in [place]  - f/u Psych recs

## 2021-02-18 NOTE — PROGRESS NOTE ADULT - PROBLEM SELECTOR PLAN 4
IVF: None  E: Replete K<4 and Mg<2  GI: None  DVT: None  Diet: Regular  Code: FULL CODE  Dispo: [facility] stepdown to [facility]  1:1 Observation Pt noted to have >10^5 CFU of E Coli in urine culture from 2/14. No Pt noted to have >10^5 CFU of E Coli in urine culture from 2/14. UA negative for WBCs, positive for LE and bacteria. Denies dysuria, increased frequency/urgency. No suprapubic tenderness.  - No indication to treat with antibiotics at this time  - Monitor for symptoms Pt admits to smoking 1 Juul pod per week (1 pod is roughly equivalent to 1 pack).  - c/w Nicotine patch 14mg daily

## 2021-02-18 NOTE — PROGRESS NOTE ADULT - ASSESSMENT
20F with no PMH, but PPH of Bipolar Disorder Type 2 c/b frequent hospitalizations, presenting after suicide attempt by ingestion of Lithium/Sertraline/Ambien, admitted to [facility] for observation and psychiatric evaluation.
20F with no PMH, but PPH of Bipolar Disorder Type 2 c/b frequent hospitalizations, presenting after suicide attempt by ingestion of Lithium/Sertraline/Ambien, admitted to [facility] for observation and psychiatric evaluation.
20F with no PMH, but PPH of Bipolar Disorder Type 2 c/b frequent hospitalizations, presenting after suicide attempt by ingestion of Lithium/Sertraline/Ambien, admitted to [facility] for observation and psychiatric evaluation.

## 2021-02-18 NOTE — PROGRESS NOTE ADULT - SUBJECTIVE AND OBJECTIVE BOX
**Transfer Acceptance Note from [facility] to [facility]**  Hospital Course:  20F with no PMH but PPH of Bipolar Disorder Type 2 on Lithium/Sertraline c/b prior suicidal ideation, presenting after possible suicide attempt by toxic ingestion of Lithium/Sertraline/Ambien pills while intoxicated with alcohol, admitted for observation and detoxification as well as psychiatric evaluation. Given that pt is now medically stable, pt will be stepped down to [facility] for further management, pending recommendations for dispo by Psychiatry.    SUBJECTIVE / INTERVAL HPI: Patient seen and examined at bedside. Denies current suicidal ideation or thoughts of self-harm. Describes mood as calm. Denies abdominal pain, heart palpitations, chest pain, nausea/vomiting, headache, diarrhea, constipation.    VITAL SIGNS:  Vital Signs Last 24 Hrs  T(C): 36.9 (17 Feb 2021 22:00), Max: 37.2 (17 Feb 2021 17:44)  T(F): 98.4 (17 Feb 2021 22:00), Max: 99 (17 Feb 2021 17:44)  HR: 80 (17 Feb 2021 20:00) (50 - 108)  BP: 112/64 (17 Feb 2021 20:00) (99/55 - 131/65)  BP(mean): 83 (17 Feb 2021 20:00) (71 - 91)  RR: 18 (17 Feb 2021 20:00) (17 - 22)  SpO2: 97% (17 Feb 2021 20:00) (97% - 97%)    PHYSICAL EXAM:    General: Young female patient in NAD  HEENT: NC/AT; PERRL, anicteric sclera; MMM  Neck: supple  Cardiovascular: +S1/S2, RRR  Respiratory: CTA B/L; no W/R/R, no crackles  Gastrointestinal: soft, NT/ND; +BSx4  Extremities: WWP; no edema, clubbing or cyanosis  Vascular: 2+ radial, DP pulses B/L  Neurological: AAOx3; no focal deficits    MEDICATIONS:  MEDICATIONS  (STANDING):  lithium 150 milliGRAM(s) Oral every 12 hours  nicotine -  14 mG/24Hr(s) Patch 1 patch Transdermal daily    MEDICATIONS  (PRN):      ALLERGIES:  Allergies    No Known Allergies    Intolerances        LABS:                        13.3   7.93  )-----------( 247      ( 17 Feb 2021 07:30 )             40.1     02-17    141  |  102  |  10  ----------------------------<  87  3.9   |  27  |  0.75    Ca    9.9      17 Feb 2021 07:30  Phos  4.2     02-17  Mg     2.0     02-17    TPro  6.6  /  Alb  4.3  /  TBili  0.4  /  DBili  x   /  AST  19  /  ALT  18  /  AlkPhos  47  02-17        CAPILLARY BLOOD GLUCOSE          RADIOLOGY & ADDITIONAL TESTS: Reviewed.  **Transfer Acceptance Note from [facility] to [facility]**  Hospital Course:  20F with no PMH but PPH of Bipolar Disorder Type 2 on Lithium/Sertraline c/b prior suicidal ideation, presenting after possible suicide attempt by toxic ingestion of Lithium/Sertraline/Ambien pills while intoxicated with alcohol, admitted for observation and detoxification as well as psychiatric evaluation. Given that pt is now medically stable, pt will be stepped down to [facility] for further management, pending recommendations for dispo by Psychiatry.    SUBJECTIVE / INTERVAL HPI: Patient seen and examined at bedside. Denies current suicidal ideation or thoughts of self-harm. Describes mood as calm. Denies dysuria, abdominal pain, heart palpitations, chest pain, nausea/vomiting, headache, diarrhea, constipation.    VITAL SIGNS:  Vital Signs Last 24 Hrs  T(C): 36.9 (17 Feb 2021 22:00), Max: 37.2 (17 Feb 2021 17:44)  T(F): 98.4 (17 Feb 2021 22:00), Max: 99 (17 Feb 2021 17:44)  HR: 80 (17 Feb 2021 20:00) (50 - 108)  BP: 112/64 (17 Feb 2021 20:00) (99/55 - 131/65)  BP(mean): 83 (17 Feb 2021 20:00) (71 - 91)  RR: 18 (17 Feb 2021 20:00) (17 - 22)  SpO2: 97% (17 Feb 2021 20:00) (97% - 97%)    PHYSICAL EXAM:    General: Young female patient in NAD  HEENT: NC/AT; PERRL, anicteric sclera; MMM  Neck: supple  Cardiovascular: +S1/S2, RRR  Respiratory: CTA B/L; no W/R/R, no crackles  Gastrointestinal: soft, NT/ND; +BSx4  Extremities: WWP; no edema, clubbing or cyanosis  Vascular: 2+ radial, DP pulses B/L  Neurological: AAOx3; no focal deficits    MEDICATIONS:  MEDICATIONS  (STANDING):  lithium 150 milliGRAM(s) Oral every 12 hours  nicotine -  14 mG/24Hr(s) Patch 1 patch Transdermal daily    MEDICATIONS  (PRN):      ALLERGIES:  Allergies    No Known Allergies    Intolerances        LABS:                        13.3   7.93  )-----------( 247      ( 17 Feb 2021 07:30 )             40.1     02-17    141  |  102  |  10  ----------------------------<  87  3.9   |  27  |  0.75    Ca    9.9      17 Feb 2021 07:30  Phos  4.2     02-17  Mg     2.0     02-17    TPro  6.6  /  Alb  4.3  /  TBili  0.4  /  DBili  x   /  AST  19  /  ALT  18  /  AlkPhos  47  02-17        CAPILLARY BLOOD GLUCOSE          RADIOLOGY & ADDITIONAL TESTS: Reviewed.

## 2021-02-18 NOTE — PROGRESS NOTE BEHAVIORAL HEALTH - NSBHFUPINTERVALHXFT_PSY_A_CORE
Patient seen at bedside, 1:1 present. States that she had a good night yesterday, her male friend Chapo who she originally came to see here in [facility] visited her and brought her her suitcases and belongings. She said they parted on good terms and she apologized to him. She has changed her departure ticket to Friday and is hopeful for discharge. Continues to deny si/hi, a/v h or paranoia. Is future oriented, wanted to remain stable and continue to see her psych team in [facility]. We discussed medications as she reported feeling slightly anxious and wanted to when she could begin to take her zoloft. She also shared that her team originally wanted her to be on Li 600mg daily, but she would only agree to 300mg, she might now be willing to take 600mg when she returns home.  -Message left with [facility] @ [phone] for callback and collateral  -Spoke with mother who states that she feels comfortable with pt returning to [facility], though pt changed flights to Friday, they would not be on the same flight as mother's is early in the morning. Mother feels that pt would receive best care with her team in [place] Patient seen at bedside, 1:1 present. States that she had a good night yesterday, her male friend Chapo who she originally came to see here in [facility] visited her and brought her her suitcases and belongings. She said they parted on good terms and she apologized to him. She has changed her departure ticket to Friday and is hopeful for discharge. Continues to deny si/hi, a/v h or paranoia. Is future oriented, wanted to remain stable and continue to see her psych team in [facility]. We discussed medications as she reported feeling slightly anxious and wanted to when she could begin to take her zoloft. She also shared that her team originally wanted her to be on Li 600mg daily, but she would only agree to 300mg, she might now be willing to take 600mg when she returns home.  -Message left with [facility] @ 0920 for callback and collateral  -Pt was able to speak with team members and though her NP is booked for the day another provider will call writer back with collateral @ 1300 today. Pt made appt for herself 2/22/21 @ 1100 with Prachi Palacios NP  -Spoke with mother who states that she feels comfortable with pt returning to [facility], though pt changed flights to Friday, they would not be on the same flight as mother's is early in the morning. Mother feels that pt would receive best care with her team in [place] instead of a psych hospital in [place] Patient seen at bedside, 1:1 present. States that she had a good night yesterday, her male friend Chapo who she originally came to see here in [facility] visited her and brought her her suitcases and belongings. She said they parted on good terms and she apologized to him. She has changed her departure ticket to Friday and is hopeful for discharge. Continues to deny si/hi, a/v h or paranoia. Is future oriented, wanted to remain stable and continue to see her psych team in [facility]. We discussed medications as she reported feeling slightly anxious and wanted to when she could begin to take her zoloft. She also shared that her team originally wanted her to be on Li 600mg daily, but she would only agree to 300mg, she might now be willing to take 600mg when she returns home.  -Message left with [facility] @ 0920 for callback and collateral  -Pt was able to speak with team members and though her NP is booked for the day another provider will call writer back with collateral @ 1300 today. Pt made appt for herself 2/22/21 @ 1100 with Prachi Palacios, NP  -Spoke with mother who states that she feels comfortable with pt returning to [facility], and would prefer it pt returned on the same flight as her @1000 Friday morning. Mother feels that pt would receive best care with her team in [place] instead of a psych hospital in [place]    Collateral obtained from Maddie PARR at [facility] who reports pt has dx of BAD I with current med regimen of Zoloft 50mg daily, Lithium 300mg BID and Ambien 5mg prn, has had numerous medication trials. Pt last saw provider in December and missed last appt 1/21/21, but has next appt scheduled for 2/22/21. Pt was last hospitalized twice in June 2021 for nas, disorganization, and psychosis. At that time she was discharged with Schizoaffective d/o bipolar type, though team feels pt has true dx of BAD 1. Team has no major concerns for pt's safety and will continue to see. Med recommendations made to team included discussion of trial of naltrexone.

## 2021-02-18 NOTE — PROGRESS NOTE ADULT - PROBLEM SELECTOR PLAN 5
IVF: None  E: Replete K<4 and Mg<2  GI: None  DVT: None  Diet: Regular  Code: FULL CODE  Dispo: [facility] stepdown to [facility]  1:1 Observation Pt noted to have >10^5 CFU of E Coli in urine culture from 2/14. UA negative for WBCs, positive for LE and bacteria. Denies dysuria, increased frequency/urgency. No suprapubic tenderness.  - No indication to treat with antibiotics at this time  - Monitor for symptoms

## 2021-02-18 NOTE — PROGRESS NOTE BEHAVIORAL HEALTH - NSBHCHARTREVIEWLAB_PSY_A_CORE FT
utox +etoh 175 upon arrival to 0.39 2/16/21  Lithium level trending down from 1.30 upon arrival to utox +etoh 175 upon arrival to 0.39 2/16/21  Lithium level trending down from 1.30 upon arrival to 0.20 2/18/21

## 2021-02-18 NOTE — PROGRESS NOTE BEHAVIORAL HEALTH - NSBHCHARTREVIEWVS_PSY_A_CORE FT
99/55, 72, 22, 98.3 106/67, 64, 97.6 `Vital Signs Last 24 Hrs  T(C): 36.4 (18 Feb 2021 08:41), Max: 37.2 (17 Feb 2021 17:44)  T(F): 97.6 (18 Feb 2021 08:41), Max: 99 (17 Feb 2021 17:44)  HR: 64 (18 Feb 2021 08:41) (64 - 108)  BP: 106/67 (18 Feb 2021 08:41) (106/67 - 131/65)  BP(mean): 84 (18 Feb 2021 03:26) (83 - 91)  RR: 16 (18 Feb 2021 08:41) (16 - 18)  SpO2: 98% (18 Feb 2021 08:41) (97% - 99%)

## 2021-02-19 VITALS
TEMPERATURE: 98 F | OXYGEN SATURATION: 99 % | SYSTOLIC BLOOD PRESSURE: 96 MMHG | DIASTOLIC BLOOD PRESSURE: 61 MMHG | RESPIRATION RATE: 16 BRPM | HEART RATE: 84 BPM

## 2021-02-19 PROCEDURE — 99239 HOSP IP/OBS DSCHRG MGMT >30: CPT | Mod: GC

## 2021-02-19 PROCEDURE — 99233 SBSQ HOSP IP/OBS HIGH 50: CPT

## 2021-02-19 RX ORDER — SERTRALINE 25 MG/1
1 TABLET, FILM COATED ORAL
Qty: 0 | Refills: 0 | DISCHARGE

## 2021-02-19 RX ORDER — HYDROXYZINE HCL 10 MG
25 TABLET ORAL
Qty: 0 | Refills: 0 | DISCHARGE

## 2021-02-19 RX ORDER — ZOLPIDEM TARTRATE 10 MG/1
1 TABLET ORAL
Qty: 0 | Refills: 0 | DISCHARGE

## 2021-02-19 RX ORDER — LITHIUM CARBONATE 300 MG/1
300 TABLET, EXTENDED RELEASE ORAL
Qty: 0 | Refills: 0 | DISCHARGE

## 2021-02-19 NOTE — PROGRESS NOTE BEHAVIORAL HEALTH - NSBHFUPINTERVALHXFT_PSY_A_CORE
Patient seen at bedside, 1:1 present. Patient remains future oriented, anticipating discharge this morning, after which time she will take an uber to airport for flight back home to [place]. Discussed pt med regimen as well as recommendation to take Lithium 300mg qhs, with plan to future titrate (with her provider) to 600mg qhs for further stabilization. Patient in agreement. No si/hi, no a/v h or paranoid ideations. Feels safe for discharge.

## 2021-02-19 NOTE — DISCHARGE NOTE NURSING/CASE MANAGEMENT/SOCIAL WORK - PATIENT PORTAL LINK FT
You can access the FollowMyHealth Patient Portal offered by [facility] by registering at the following website: [email or web address]. By joining [facility]’s FollowMyHealth portal, you will also be able to view your health information using other applications (apps) compatible with our system.

## 2021-02-19 NOTE — DISCHARGE NOTE NURSING/CASE MANAGEMENT/SOCIAL WORK - NSDCPEWEB_GEN_ALL_CORE
[facility] for Tobacco Control website --- [email or web address] website --- [email or web address]

## 2021-02-19 NOTE — DISCHARGE NOTE PROVIDER - NSDCCPCAREPLAN_GEN_ALL_CORE_FT
PRINCIPAL DISCHARGE DIAGNOSIS  Diagnosis: Drug overdose, undetermined intent, initial encounter  Assessment and Plan of Treatment: You had toxic levels of lithium in your system. There was no damage to your liver from the lithium and you did not need dialysis; the levels went down on their own.      SECONDARY DISCHARGE DIAGNOSES  Diagnosis: Suicidal ideations  Assessment and Plan of Treatment: Please follow up with your team in [place]. If you feel that you are a danger to yourself, the best thing to do is to dial [phone]. If you need to talk to a counsellor, dial [phone] or Text HELP to [phone].     PRINCIPAL DISCHARGE DIAGNOSIS  Diagnosis: Drug overdose, undetermined intent, initial encounter  Assessment and Plan of Treatment: You had toxic levels of lithium in your system. There was no damage to your liver from the lithium and you did not need dialysis; the levels went down on their own. Please take the 300mg tablet once before bed until you see your [place] providers.  Remember to talk with them about if you need a lithium level and whether or not you should start naloxone.      SECONDARY DISCHARGE DIAGNOSES  Diagnosis: Suicidal ideations  Assessment and Plan of Treatment: Please follow up with your team in [place]. If you feel that you are a danger to yourself, the best thing to do is to dial [phone]. If you need to talk to a counsellor, dial [phone] or Text HELP to [phone].

## 2021-02-19 NOTE — DISCHARGE NOTE PROVIDER - NSDCFUADDAPPT_GEN_ALL_CORE_FT
You have an appointment with your providers in [place] on 2/22/21. Please follow up with them and discuss whether or not you should get a repeat lithium level.
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
## 2021-02-19 NOTE — PROGRESS NOTE BEHAVIORAL HEALTH - RISK ASSESSMENT
low acute risk (future oriented thinking, motivation for sobriety and mental health treatment, good social support)  patient presents at chronic risk due to her chronic mental illness and substance abuse

## 2021-02-19 NOTE — DISCHARGE NOTE PROVIDER - PROVIDER TOKENS
FREE:[LAST:[[facility]],PHONE:[(   )    -],FAX:[(   )    -]] FREE:[LAST:[Suzanne],FIRST:[Prachi],PHONE:[(   )    -],FAX:[(   )    -],SCHEDULEDAPPT:[02/22/2021],SCHEDULEDAPPTTIME:[11:00 AM]]

## 2021-02-19 NOTE — PROGRESS NOTE BEHAVIORAL HEALTH - NSBHADMITCOUNSEL_PSY_A_CORE
diagnostic results/impressions and/or recommended studies/instructions for management, treatment and follow up/risk factor reduction

## 2021-02-19 NOTE — PROGRESS NOTE BEHAVIORAL HEALTH - NSBHCHARTREVIEWLAB_PSY_A_CORE FT
utox +etoh 175 upon arrival to 0.39 2/16/21  Lithium level trending down from 1.30 upon arrival to 0.20 2/18/21

## 2021-02-19 NOTE — PROGRESS NOTE BEHAVIORAL HEALTH - NSBHCHARTREVIEWINVESTIGATE_PSY_A_CORE FT
2/14/21    Sinus rhythm with 1st degree av block

## 2021-02-19 NOTE — DISCHARGE NOTE PROVIDER - NSDCMRMEDTOKEN_GEN_ALL_CORE_FT
hydrOXYzine pamoate 25 mg oral capsule: 25 milligram(s) orally 2 times a day, As Needed  lithium carbonate: 300 milligram(s) orally once a day (at bedtime)  sertraline 50 mg oral tablet: 1 tab(s) orally once a day  zolpidem 5 mg oral tablet: 1 tab(s) orally once a day (at bedtime)   lithium carbonate: 300 milligram(s) orally once a day (at bedtime)

## 2021-02-19 NOTE — DISCHARGE NOTE PROVIDER - CARE PROVIDER_API CALL
[facility],   Phone: (   )    -  Fax: (   )    -  Follow Up Time:    Prachi Palacios  Phone: (   )    -  Fax: (   )    -  Scheduled Appointment: 02/22/2021 11:00 AM

## 2021-02-19 NOTE — PROGRESS NOTE BEHAVIORAL HEALTH - SUMMARY
Patient is a single, domiciled, unemployed, 19y/o student originally from [facility] with no significant medical history. Psychiatric hx significant for diagnoses including bipolar disorder II, alcohol use disorder, previous dx of depression and borderline personality disorder, she has been [place] 7 times with one inpt psychiatric hospitalization. Psychiatric hx is significant for completed suicide of maternal uncle, maternal grandfather, and maternal great grandmother as well as significant family hx of alcohol abuse/dependence. Pt reports trauma hx and self-diagnoses PTSD. Pt is currently in treatment with team in [place] and has been compliant with new medication regimen of Seroquel 100mg/Lithium 300mg daily x6 months since being correctly diagnosed with BAD II. Patient is transferred from [facility] after presenting with overdose of alcohol, ~12 sertraline, ~6 lithium and unknown number of ambien.    Patient future oriented, anticipating return to [place] today, and continuing care. Pt denies si/hi, a/v h or paranoid ideation. will leave hospital for airport and have appt with provider on 2/22/21 @ 1100    Plan  -Discharge from hospital, pt will go to airport and return to [place]  -Recommend Lithium 300mg qhs with future plan to titrate to 600mg qhs with her provider

## 2021-02-19 NOTE — DISCHARGE NOTE NURSING/CASE MANAGEMENT/SOCIAL WORK - NSDCPEEMAIL_GEN_ALL_CORE
[facility] for Tobacco Control email [email or web address]

## 2021-02-19 NOTE — PROGRESS NOTE BEHAVIORAL HEALTH - NSBHCONSULTFOLLOWAFTERCARE_PSY_A_CORE FT
[facility] appt 2/22/21 @ 1100 Prachi Palacios
[facility] appt 2/22/21 @ 1100 Prachi Palacios

## 2021-02-19 NOTE — PROGRESS NOTE BEHAVIORAL HEALTH - NSBHCHARTREVIEWVS_PSY_A_CORE FT
Vital Signs Last 24 Hrs  T(C): 36.8 (19 Feb 2021 08:30), Max: 36.9 (18 Feb 2021 17:14)  T(F): 98.2 (19 Feb 2021 08:30), Max: 98.5 (18 Feb 2021 17:14)  HR: 84 (19 Feb 2021 08:30) (64 - 84)  BP: 96/61 (19 Feb 2021 08:30) (96/61 - 117/80)  BP(mean): --  RR: 16 (19 Feb 2021 08:30) (16 - 20)  SpO2: 99% (19 Feb 2021 08:30) (97% - 99%)
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
## 2021-02-24 DIAGNOSIS — I45.10 UNSPECIFIED RIGHT BUNDLE-BRANCH BLOCK: ICD-10-CM

## 2021-02-24 DIAGNOSIS — T43.592A POISONING BY OTHER ANTIPSYCHOTICS AND NEUROLEPTICS, INTENTIONAL SELF-HARM, INITIAL ENCOUNTER: ICD-10-CM
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
## 2021-02-24 DIAGNOSIS — F10.129 ALCOHOL ABUSE WITH INTOXICATION, UNSPECIFIED: ICD-10-CM

## 2021-02-24 DIAGNOSIS — F60.3 BORDERLINE PERSONALITY DISORDER: ICD-10-CM

## 2021-02-24 DIAGNOSIS — F31.81 BIPOLAR II DISORDER: ICD-10-CM

## 2021-02-24 DIAGNOSIS — T43.222A POISONING BY SELECTIVE SEROTONIN REUPTAKE INHIBITORS, INTENTIONAL SELF-HARM, INITIAL ENCOUNTER: ICD-10-CM

## 2021-02-24 DIAGNOSIS — R45.851 SUICIDAL IDEATIONS: ICD-10-CM

## 2021-02-24 NOTE — ED CDU PROVIDER DISPOSITION NOTE - CLINICAL COURSE
patient arrived with overdose of multiple prescription drugs, monitored in the ED for decline in ms, which improved, but lithium levels uptrending, discussed with medicine, and accepted to [facility], with 1:1 for likely psych admission and transfer once medically cleared.

## 2023-06-23 NOTE — H&P ADULT - PROBLEM/PLAN-4
Abdomen , soft, nontender, nondistended , no guarding or rigidity , no masses palpable , normal bowel sounds , Liver and Spleen,  no hepatosplenomegaly , liver nontender DISPLAY PLAN FREE TEXT

## 2024-03-06 NOTE — ED ADULT TRIAGE NOTE - ARRIVAL FROM
Awaiting results of recent labs.  Continue methimazole and will make adjustments to dose if needed.    Street

## 2025-01-31 NOTE — BEHAVIORAL HEALTH ASSESSMENT NOTE - NS ED BHA MSE SPEECH RATE
Order placed for a replacement ASV device with EPAP 5-15 cm and PS 0-15 cm, rate auto. His prior device was over 5 years old and was reading a message that the expected motor life has been exceeded.  Bennett Goltz, PA-C    Normal

## 2025-07-18 NOTE — ED CDU PROVIDER INITIAL DAY NOTE - TEMPLATE, MLM
Call made to pt wife General